# Patient Record
Sex: FEMALE | Race: WHITE | NOT HISPANIC OR LATINO | Employment: OTHER | URBAN - METROPOLITAN AREA
[De-identification: names, ages, dates, MRNs, and addresses within clinical notes are randomized per-mention and may not be internally consistent; named-entity substitution may affect disease eponyms.]

---

## 2017-04-25 RX ORDER — FLUTICASONE PROPIONATE 50 MCG
2 SPRAY, SUSPENSION (ML) NASAL DAILY
Status: ON HOLD | COMMUNITY
End: 2018-07-25

## 2017-04-26 ENCOUNTER — ANESTHESIA EVENT (OUTPATIENT)
Dept: SURGERY | Facility: CLINIC | Age: 72
End: 2017-04-26
Payer: COMMERCIAL

## 2017-04-26 ENCOUNTER — HOSPITAL ENCOUNTER (OUTPATIENT)
Facility: CLINIC | Age: 72
Discharge: HOME | End: 2017-04-26
Attending: OPHTHALMOLOGY | Admitting: OPHTHALMOLOGY
Payer: COMMERCIAL

## 2017-04-26 ENCOUNTER — ANESTHESIA (OUTPATIENT)
Dept: SURGERY | Facility: CLINIC | Age: 72
End: 2017-04-26
Payer: COMMERCIAL

## 2017-04-26 VITALS
SYSTOLIC BLOOD PRESSURE: 141 MMHG | WEIGHT: 136 LBS | RESPIRATION RATE: 16 BRPM | OXYGEN SATURATION: 95 % | DIASTOLIC BLOOD PRESSURE: 76 MMHG | HEIGHT: 65 IN | BODY MASS INDEX: 22.66 KG/M2

## 2017-04-26 PROCEDURE — 25000128 H RX IP 250 OP 636: Performed by: NURSE ANESTHETIST, CERTIFIED REGISTERED

## 2017-04-26 PROCEDURE — 71000028 ZZH EYE RECOVERY PHASE 2 EACH 15 MINS: Performed by: OPHTHALMOLOGY

## 2017-04-26 PROCEDURE — 25000125 ZZHC RX 250: Performed by: OPHTHALMOLOGY

## 2017-04-26 PROCEDURE — 37000008 ZZH ANESTHESIA TECHNICAL FEE, 1ST 30 MIN: Performed by: OPHTHALMOLOGY

## 2017-04-26 PROCEDURE — 25000132 ZZH RX MED GY IP 250 OP 250 PS 637: Performed by: OPHTHALMOLOGY

## 2017-04-26 PROCEDURE — 40000170 ZZH STATISTIC PRE-PROCEDURE ASSESSMENT II: Performed by: OPHTHALMOLOGY

## 2017-04-26 PROCEDURE — 25800025 ZZH RX 258: Performed by: ANESTHESIOLOGY

## 2017-04-26 PROCEDURE — 27210794 ZZH OR GENERAL SUPPLY STERILE: Performed by: OPHTHALMOLOGY

## 2017-04-26 PROCEDURE — V2632 POST CHMBR INTRAOCULAR LENS: HCPCS | Performed by: OPHTHALMOLOGY

## 2017-04-26 PROCEDURE — 25000125 ZZHC RX 250: Performed by: ANESTHESIOLOGY

## 2017-04-26 PROCEDURE — 25000125 ZZHC RX 250: Performed by: NURSE ANESTHETIST, CERTIFIED REGISTERED

## 2017-04-26 PROCEDURE — 36000101 ZZH EYE SURGERY LEVEL 3 1ST 30 MIN: Performed by: OPHTHALMOLOGY

## 2017-04-26 DEVICE — EYE IMP IOL AMO PCL TECNIS ZCB00 20.5: Type: IMPLANTABLE DEVICE | Site: EYE | Status: FUNCTIONAL

## 2017-04-26 RX ORDER — PROPARACAINE HYDROCHLORIDE 5 MG/ML
1 SOLUTION/ DROPS OPHTHALMIC ONCE
Status: COMPLETED | OUTPATIENT
Start: 2017-04-26 | End: 2017-04-26

## 2017-04-26 RX ORDER — BALANCED SALT SOLUTION 6.4; .75; .48; .3; 3.9; 1.7 MG/ML; MG/ML; MG/ML; MG/ML; MG/ML; MG/ML
SOLUTION OPHTHALMIC PRN
Status: DISCONTINUED | OUTPATIENT
Start: 2017-04-26 | End: 2017-04-26 | Stop reason: HOSPADM

## 2017-04-26 RX ORDER — MOXIFLOXACIN 5 MG/ML
1 SOLUTION/ DROPS OPHTHALMIC
Status: COMPLETED | OUTPATIENT
Start: 2017-04-26 | End: 2017-04-26

## 2017-04-26 RX ORDER — SODIUM CHLORIDE, SODIUM LACTATE, POTASSIUM CHLORIDE, CALCIUM CHLORIDE 600; 310; 30; 20 MG/100ML; MG/100ML; MG/100ML; MG/100ML
500 INJECTION, SOLUTION INTRAVENOUS CONTINUOUS
Status: DISCONTINUED | OUTPATIENT
Start: 2017-04-26 | End: 2017-04-26 | Stop reason: HOSPADM

## 2017-04-26 RX ORDER — ONDANSETRON 2 MG/ML
INJECTION INTRAMUSCULAR; INTRAVENOUS PRN
Status: DISCONTINUED | OUTPATIENT
Start: 2017-04-26 | End: 2017-04-26

## 2017-04-26 RX ORDER — TROPICAMIDE 10 MG/ML
1 SOLUTION/ DROPS OPHTHALMIC
Status: COMPLETED | OUTPATIENT
Start: 2017-04-26 | End: 2017-04-26

## 2017-04-26 RX ORDER — DICLOFENAC SODIUM 1 MG/ML
1 SOLUTION/ DROPS OPHTHALMIC
Status: COMPLETED | OUTPATIENT
Start: 2017-04-26 | End: 2017-04-26

## 2017-04-26 RX ORDER — PROPARACAINE HYDROCHLORIDE 5 MG/ML
1 SOLUTION/ DROPS OPHTHALMIC ONCE
Status: DISCONTINUED | OUTPATIENT
Start: 2017-04-26 | End: 2017-04-26 | Stop reason: HOSPADM

## 2017-04-26 RX ORDER — PHENYLEPHRINE HYDROCHLORIDE 25 MG/ML
1 SOLUTION/ DROPS OPHTHALMIC
Status: COMPLETED | OUTPATIENT
Start: 2017-04-26 | End: 2017-04-26

## 2017-04-26 RX ORDER — LIDOCAINE HYDROCHLORIDE 10 MG/ML
INJECTION, SOLUTION EPIDURAL; INFILTRATION; INTRACAUDAL; PERINEURAL PRN
Status: DISCONTINUED | OUTPATIENT
Start: 2017-04-26 | End: 2017-04-26 | Stop reason: HOSPADM

## 2017-04-26 RX ADMIN — PHENYLEPHRINE HYDROCHLORIDE 1 DROP: 2.5 SOLUTION/ DROPS OPHTHALMIC at 07:39

## 2017-04-26 RX ADMIN — DEXMEDETOMIDINE HYDROCHLORIDE 8 MCG: 100 INJECTION, SOLUTION INTRAVENOUS at 08:29

## 2017-04-26 RX ADMIN — TROPICAMIDE 1 DROP: 10 SOLUTION/ DROPS OPHTHALMIC at 07:35

## 2017-04-26 RX ADMIN — PHENYLEPHRINE HYDROCHLORIDE 1 DROP: 2.5 SOLUTION/ DROPS OPHTHALMIC at 07:42

## 2017-04-26 RX ADMIN — MIDAZOLAM HYDROCHLORIDE 1 MG: 1 INJECTION, SOLUTION INTRAMUSCULAR; INTRAVENOUS at 08:31

## 2017-04-26 RX ADMIN — DICLOFENAC SODIUM 1 DROP: 1 SOLUTION/ DROPS OPHTHALMIC at 07:35

## 2017-04-26 RX ADMIN — MOXIFLOXACIN HYDROCHLORIDE 1 DROP: 5 SOLUTION/ DROPS OPHTHALMIC at 07:35

## 2017-04-26 RX ADMIN — PROPARACAINE HYDROCHLORIDE 1 DROP: 5 SOLUTION/ DROPS OPHTHALMIC at 07:35

## 2017-04-26 RX ADMIN — DICLOFENAC SODIUM 1 DROP: 1 SOLUTION/ DROPS OPHTHALMIC at 07:39

## 2017-04-26 RX ADMIN — MOXIFLOXACIN HYDROCHLORIDE 1 DROP: 5 SOLUTION/ DROPS OPHTHALMIC at 07:39

## 2017-04-26 RX ADMIN — PHENYLEPHRINE HYDROCHLORIDE 1 DROP: 2.5 SOLUTION/ DROPS OPHTHALMIC at 07:35

## 2017-04-26 RX ADMIN — SODIUM CHLORIDE, POTASSIUM CHLORIDE, SODIUM LACTATE AND CALCIUM CHLORIDE 500 ML: 600; 310; 30; 20 INJECTION, SOLUTION INTRAVENOUS at 07:47

## 2017-04-26 RX ADMIN — ONDANSETRON 4 MG: 2 INJECTION INTRAMUSCULAR; INTRAVENOUS at 08:45

## 2017-04-26 RX ADMIN — TROPICAMIDE 1 DROP: 10 SOLUTION/ DROPS OPHTHALMIC at 07:39

## 2017-04-26 RX ADMIN — TROPICAMIDE 1 DROP: 10 SOLUTION/ DROPS OPHTHALMIC at 07:42

## 2017-04-26 RX ADMIN — LIDOCAINE HYDROCHLORIDE 1 ML: 10 INJECTION, SOLUTION EPIDURAL; INFILTRATION; INTRACAUDAL; PERINEURAL at 07:46

## 2017-04-26 RX ADMIN — MOXIFLOXACIN HYDROCHLORIDE 1 DROP: 5 SOLUTION/ DROPS OPHTHALMIC at 07:42

## 2017-04-26 RX ADMIN — DICLOFENAC SODIUM 1 DROP: 1 SOLUTION/ DROPS OPHTHALMIC at 07:42

## 2017-04-26 ASSESSMENT — ENCOUNTER SYMPTOMS
SEIZURES: 0
ORTHOPNEA: 0

## 2017-04-26 NOTE — IP AVS SNAPSHOT
[facility]    [address]    [place] [address]    Phone:  [phone]                                       After Visit Summary   4/26/2017    Tracy Yee    MRN: [id number]           After Visit Summary Signature Page     I have received my discharge instructions, and my questions have been answered. I have discussed any challenges I see with this plan with the nurse or doctor.    ..........................................................................................................................................  Patient/Patient Representative Signature      ..........................................................................................................................................  Patient Representative Print Name and Relationship to Patient    ..................................................               ................................................  Date                                            Time    ..........................................................................................................................................  Reviewed by Signature/Title    ...................................................              ..............................................  Date                                                            Time

## 2017-04-26 NOTE — IP AVS SNAPSHOT
MRN:[id number]                      After Visit Summary   4/26/2017    Tracy Yee    MRN: [id number]           Thank you!     Thank you for choosing [facility] for your care. Our goal is always to provide you with excellent care. Hearing back from our patients is one way we can continue to improve our services. Please take a few minutes to complete the written survey that you may receive in the mail after you visit with us. Thank you!        Patient Information     Date Of Birth          1945        About your hospital stay     You were admitted on:  April 26, 2017 You last received care in the:  [facility]    You were discharged on:  April 26, 2017       Who to Call     For medical emergencies, please call 911.  For non-urgent questions about your medical care, please call your primary care provider or clinic, [phone]  For questions related to your surgery, please call your surgery clinic        Attending Provider     Provider Specialty    Judd Moscoso MD Ophthalmology       Primary Care Provider Office Phone # Fax #    Kaye Keene [phone]       [facility] [address]  [facility] [address]        Your next 10 appointments already scheduled     May 03, 2017   Procedure with Judd Moscoso MD   [facility]OP [facility] (--)    [address]  [facility] [address] [phone]              Further instructions from your care team       [facility] Discharge  Instructions  Anesthesia ([facility])   Adult Discharge Instructions    For 24 hours after surgery    1. Get plenty of rest.  Make arrangements to have a responsible adult stay with you for at least 6 hours after you leave the hospital.  2. Do not drive or use heavy equipment for 24 hours.    3. Do not drink alcohol for 24 hours.  4. Do not sign legal documents or make important decisions for 24  hours.  5. Avoid strenuous or risky activities. You may feel lightheaded.  If so, sit for a few minutes before standing.  Have someone help you get up.   6. Conscious sedation patients may resume a regular diet..  7. Any questions of medical nature, call your physician.        POST-OPERATIVE CARE FOLLOWING CATARACT SURGERY    DR. HOLLAND PONCE  [facility]  [phone]    Postoperative medications: After surgery, you will use several different eye drop medications. You may have either the brand specific form or generic of each type used.     Begin your eye drops today as directed.  You should instill the drop, close the eye without blinking and keep closed for 3 minutes allowing the drop to absorb.  It is fine to instill all 3 of the drops consecutively, waiting the 3 minutes in between each one. Place the shield for sleep.  In the morning, instill 1 drop of all 3 eye drops before your post-op appointment.      Antibiotic  Moxeza - is an antibiotic drop that is used to minimize the risk of infection. Instill your first drop at bedtime tonight, then 2 times daily for one week.       -OR-    Ofloxacin - this generic antibiotic is to be used 4 times a day for one week, you can start using this drop after you get home, instilling 3 separate times on the day of surgery and then 4 times a day there after.     Anti-inflammatory   Prolensa - use it once daily until gone, beginning today.    -OR-    Ketorolac -this generic drop should be used 4 times daily until gone. You can start your drops when you get home, instilling 3 separate times on the day of surgery.     Steroid  Durezol - is a steroid drop used to minimize inflammation and modulate healing.  It should be used 2 times daily until gone, beginning with your first drop at bedtime tonight.    -OR-    Prednisolone - the generic form should be used 4 times daily for 3 weeks or until gone. You can start your first drop after you get home, instilling 3 separate  "times on the day of surgery.        Do not rub the operated eye. Wear the eye shield during sleeping hours for one week.      Light sensitivity may be noticed. Sunglasses may be worn for comfort.      Some discomfort and irritation may be noticed. Acetaminophen (Tylenol) or Ibuprofen (Advil) may be taken for discomfort.      Avoid bending over, strenuous activity or heavy lifting for one week.      Keep the operated eye dry.      You may wash your hair, bathe or shower, but keep the operated eye closed while doing so.      Use medication exactly as prescribed by your doctor.  You may restart your regular home medications.      Bring all materials and medications to the clinic on your first post-operative visit.      Call the doctor s office if any of the following should occur:  -  any sudden vision change  -  nausea or severe headache  -  increase in pain not controlled  -  increased amount of floaters (black spots in front of vision)         -  or signs of infection (pus, increasing redness or tenderness)            Revised 12/10/2015    Pending Results     No orders found from 4/24/2017 to 4/27/2017.            Admission Information     Date & Time Provider Department Dept. Phone    4/26/2017 Judd Moscoso MD [facility] [phone]      Your Vitals Were     Height Weight BMI (Body Mass Index)             1.64 m (5' 4.57\") 61.7 kg (136 lb) 22.94 kg/m2         [employer] Information     [employer] lets you send messages to your doctor, view your test results, renew your prescriptions, schedule appointments and more. To sign up, go to [email or web address] . Click on \"Log in\" on the left side of the screen, which will take you to the Welcome page. Then click on \"Sign up Now\" on the right side of the page.     You will be asked to enter the access code listed below, as well as some personal information. Please follow the directions to create your username and password.     Your access code is: " [email or web address]  Expires: 2017  8:47 AM     Your access code will  in 90 days. If you need help or a new code, please call your [facility] or [phone].        Care EveryWhere ID     This is your Care EveryWhere ID. This could be used by other organizations to access your [facility] medical records  [id number]           Review of your medicines      UNREVIEWED medicines. Ask your doctor about these medicines        Dose / Directions    fluticasone 50 MCG/ACT spray   Commonly known as:  FLONASE        Dose:  2 spray   Spray 2 sprays into both nostrils daily   Refills:  0       HYDROCHLOROTHIAZIDE PO        Dose:  12.5 mg   Take 12.5 mg by mouth daily   Refills:  0       INDERAL LA PO        Dose:  60 mg   Take 60 mg by mouth daily   Refills:  0       SYNTHROID PO        Dose:  12.5 mcg   Take 12.5 mcg by mouth daily   Refills:  0                Protect others around you: Learn how to safely use, store and throw away your medicines at www.disposemymeds.org.             Medication List: This is a list of all your medications and when to take them. Check marks below indicate your daily home schedule. Keep this list as a reference.      Medications           Morning Afternoon Evening Bedtime As Needed    fluticasone 50 MCG/ACT spray   Commonly known as:  FLONASE   Spray 2 sprays into both nostrils daily                                HYDROCHLOROTHIAZIDE PO   Take 12.5 mg by mouth daily                                INDERAL LA PO   Take 60 mg by mouth daily                                SYNTHROID PO   Take 12.5 mcg by mouth daily

## 2017-04-26 NOTE — OP NOTE
[facility]  Ophthalmology Operative Note    PREOPERATIVE DIAGNOSIS:  Dense cataract of the Right eye.       POSTOPERATIVE DIAGNOSIS:  Dense cataract of the Right eye.       OPERATION:  Clear corneal phacoemulsification with intraocular lens implant of the Right eye.       PROCEDURE:  Tracy Yee was brought into the operating room with a topical anesthetic.  Under the microscope after a sterile ophthalmic prep, a lid speculum was put into place.  Anterior chamber was entered with a #75 blade.  Anterior chamber was then filled with lidocaine solution and a viscoelastic material.  A keratome blade was then used to fashion a 2.6 mm temporal incision in the corneoscleral junction and anterior capsule of the lens was opened using a circular capsulorrhexis.  The lens was carefully hydrodissected.  The phacoemulsification tip was then introduced into the eye and a central groove fashioned.  The nucleus was split in half and then quartered, and nuclear material was aspirated.  The irrigation-aspiration apparatus was then utilized to clear the remaining cortical material.  The posterior capsule was polished and a foldable posterior chamber intraocular lens was then introduced into the capsular bag, unfolded, and rotated into the horizontal position.  No sutures were necessary to close the incision site.  The viscoelastic material was aspirated.  Drops of Moxeza and Prolensa were used on the surface of the eye and a clear shield was put over the eye before the patient was dismissed from the operating room.  The patient tolerated the procedure without difficulty.       Implant Name Type Inv. Item Serial No.  Lot No. LRB No. Used   EYE IMP IOL SILVIA PCL TECNIS ZCB00 20.5 Lens/Eye Implant EYE IMP IOL SIVLIA PCL TECNIS ZCB00 20.5 [id number] [employer]   Right 1           HOLLAND PONCE MD

## 2017-04-26 NOTE — DISCHARGE INSTRUCTIONS
[facility] Anesthesia Eye Care Center Discharge  Instructions  Anesthesia ([facility])   Adult Discharge Instructions    For 24 hours after surgery    1. Get plenty of rest.  Make arrangements to have a responsible adult stay with you for at least 6 hours after you leave the hospital.  2. Do not drive or use heavy equipment for 24 hours.    3. Do not drink alcohol for 24 hours.  4. Do not sign legal documents or make important decisions for 24 hours.  5. Avoid strenuous or risky activities. You may feel lightheaded.  If so, sit for a few minutes before standing.  Have someone help you get up.   6. Conscious sedation patients may resume a regular diet..  7. Any questions of medical nature, call your physician.        POST-OPERATIVE CARE FOLLOWING CATARACT SURGERY    DR. HOLLAND PONCE  [place] [facility]  [phone]    Postoperative medications: After surgery, you will use several different eye drop medications. You may have either the brand specific form or generic of each type used.     Begin your eye drops today as directed.  You should instill the drop, close the eye without blinking and keep closed for 3 minutes allowing the drop to absorb.  It is fine to instill all 3 of the drops consecutively, waiting the 3 minutes in between each one. Place the shield for sleep.  In the morning, instill 1 drop of all 3 eye drops before your post-op appointment.      Antibiotic  Moxeza - is an antibiotic drop that is used to minimize the risk of infection. Instill your first drop at bedtime tonight, then 2 times daily for one week.       -OR-    Ofloxacin - this generic antibiotic is to be used 4 times a day for one week, you can start using this drop after you get home, instilling 3 separate times on the day of surgery and then 4 times a day there after.     Anti-inflammatory   Prolensa - use it once daily until gone, beginning today.    -OR-    Ketorolac -this generic drop should be used 4 times daily until  gone. You can start your drops when you get home, instilling 3 separate times on the day of surgery.     Steroid  Durezol - is a steroid drop used to minimize inflammation and modulate healing.  It should be used 2 times daily until gone, beginning with your first drop at bedtime tonight.    -OR-    Prednisolone - the generic form should be used 4 times daily for 3 weeks or until gone. You can start your first drop after you get home, instilling 3 separate times on the day of surgery.        Do not rub the operated eye. Wear the eye shield during sleeping hours for one week.      Light sensitivity may be noticed. Sunglasses may be worn for comfort.      Some discomfort and irritation may be noticed. Acetaminophen (Tylenol) or Ibuprofen (Advil) may be taken for discomfort.      Avoid bending over, strenuous activity or heavy lifting for one week.      Keep the operated eye dry.      You may wash your hair, bathe or shower, but keep the operated eye closed while doing so.      Use medication exactly as prescribed by your doctor.  You may restart your regular home medications.      Bring all materials and medications to the clinic on your first post-operative visit.      Call the doctor s office if any of the following should occur:  -  any sudden vision change  -  nausea or severe headache  -  increase in pain not controlled  -  increased amount of floaters (black spots in front of vision)         -  or signs of infection (pus, increasing redness or tenderness)            Revised 12/10/2015

## 2017-04-26 NOTE — ANESTHESIA PREPROCEDURE EVALUATION
"Procedure: Procedure(s):  PHACOEMULSIFICATION CLEAR CORNEA WITH STANDARD INTRAOCULAR LENS IMPLANT  Preop diagnosis: RIGHT EYE CATARACT  Allergies   Allergen Reactions     Sulfa Drugs      Sulfonamide antibiotics     There is no problem list on file for this patient.    Past Medical History:   Diagnosis Date     Breast cancer (H)     breast lumpectomy     Hypertension      Osteopenia      Parkinson's disease (H)      Prediabetes      Thyroid disease     hypothyroidism     Past Surgical History:   Procedure Laterality Date     BREAST SURGERY      lumpectomy     COLONOSCOPY       GYN SURGERY      hysterectomy       No current facility-administered medications on file prior to encounter.   No current outpatient prescriptions on file prior to encounter.  Ht 1.64 m (5' 4.57\")  Wt 61.7 kg (136 lb)  BMI 22.94 kg/m2    HGB 13.8  K 4.0  Anesthesia Evaluation     . Pt has had prior anesthetic.     History of anesthetic complications   - PONV        ROS/MED HX    ENT/Pulmonary:      (-) sleep apnea and recent URI   Neurologic:     (+)Parkinson's disease    (-) seizures, CVA and migraines   Cardiovascular:     (+) hypertension----. : . . . :. .      (-) CHF and orthopnea/PND   METS/Exercise Tolerance:     Hematologic:  - neg hematologic  ROS       Musculoskeletal: Comment: osteopenia        GI/Hepatic:        (-) GERD   Renal/Genitourinary:  - ROS Renal section negative       Endo: Comment: prediabetic    (+) thyroid problem hypothyroidism, .   (-) Type II DM   Psychiatric:         Infectious Disease:  - neg infectious disease ROS       Malignancy:   (+) Malignancy History of Breast          Other: Comment: Cystocele/ rectocele/ uterine prolapse                    Physical Exam  Normal systems: cardiovascular, pulmonary and dental    Airway   Mallampati: II  TM distance: >3 FB  Neck ROM: limited    Dental     Cardiovascular   Rhythm and rate: regular and normal      Pulmonary    breath sounds clear to auscultation           "          Anesthesia Plan      History & Physical Review  History and physical reviewed and following examination; no interval change.    ASA Status:  2 .    NPO Status:  > 8 hours    Plan for MAC Reason for MAC:  Procedure to face, neck, head or breast  PONV prophylaxis:  Ondansetron (or other 5HT-3)       Postoperative Care  Postoperative pain management:  IV analgesics.      Consents  Anesthetic plan, risks, benefits and alternatives discussed with:  Patient..                          .

## 2017-04-26 NOTE — ANESTHESIA CARE TRANSFER NOTE
Patient: Tracy Yee    Procedure(s):  RIGHT EYE PHACOEMULSIFICATION CLEAR CORNEA WITH STANDARD INTRAOCULAR LENS IMPLANT  - Wound Class: I-Clean    Diagnosis: RIGHT EYE CATARACT  Diagnosis Additional Information: No value filed.    Anesthesia Type:   MAC     Note:  Airway :Room Air  Patient transferred to:PACU  Comments: Pt exhibits spont resps, all monitors and alarms on in pacu, report given to RN, vss.      Vitals: (Last set prior to Anesthesia Care Transfer)    CRNA VITALS  4/26/2017 0821 - 4/26/2017 0857      4/26/2017             Pulse: 59    Ht Rate: 59    SpO2: 98 %    Resp Rate (set): 10                Electronically Signed By: ABI Tillman CRNA  April 26, 2017  8:57 AM

## 2017-04-26 NOTE — ANESTHESIA POSTPROCEDURE EVALUATION
Patient: Tracy Yee    Procedure(s):  RIGHT EYE PHACOEMULSIFICATION CLEAR CORNEA WITH STANDARD INTRAOCULAR LENS IMPLANT  - Wound Class: I-Clean    Diagnosis:RIGHT EYE CATARACT  Diagnosis Additional Information: No value filed.    Anesthesia Type:  MAC    Note:  Anesthesia Post Evaluation    Patient location during evaluation: PACU  Patient participation: Able to fully participate in evaluation  Level of consciousness: awake  Pain management: adequate  Airway patency: patent  Cardiovascular status: acceptable  Respiratory status: acceptable  Hydration status: acceptable  PONV: none     Anesthetic complications: None          Last vitals:  Vitals:    04/26/17 0858 04/26/17 0905   BP: 133/79 141/76   Resp: 16 16   SpO2: 96% 95%         Electronically Signed By: Elie Vega MD  April 26, 2017  4:58 PM

## 2017-05-03 ENCOUNTER — SURGERY (OUTPATIENT)
Age: 72
End: 2017-05-03

## 2017-05-03 ENCOUNTER — ANESTHESIA EVENT (OUTPATIENT)
Dept: SURGERY | Facility: CLINIC | Age: 72
End: 2017-05-03
Payer: COMMERCIAL

## 2017-05-03 ENCOUNTER — ANESTHESIA (OUTPATIENT)
Dept: SURGERY | Facility: CLINIC | Age: 72
End: 2017-05-03
Payer: COMMERCIAL

## 2017-05-03 ENCOUNTER — HOSPITAL ENCOUNTER (OUTPATIENT)
Facility: CLINIC | Age: 72
Discharge: HOME | End: 2017-05-03
Attending: OPHTHALMOLOGY | Admitting: OPHTHALMOLOGY
Payer: COMMERCIAL

## 2017-05-03 VITALS
RESPIRATION RATE: 15 BRPM | TEMPERATURE: 98.4 F | WEIGHT: 136 LBS | BODY MASS INDEX: 22.66 KG/M2 | SYSTOLIC BLOOD PRESSURE: 100 MMHG | HEART RATE: 63 BPM | DIASTOLIC BLOOD PRESSURE: 66 MMHG | HEIGHT: 65 IN | OXYGEN SATURATION: 97 %

## 2017-05-03 PROCEDURE — V2632 POST CHMBR INTRAOCULAR LENS: HCPCS | Performed by: OPHTHALMOLOGY

## 2017-05-03 PROCEDURE — 25000125 ZZHC RX 250: Performed by: ANESTHESIOLOGY

## 2017-05-03 PROCEDURE — 36000101 ZZH EYE SURGERY LEVEL 3 1ST 30 MIN: Performed by: OPHTHALMOLOGY

## 2017-05-03 PROCEDURE — 25800025 ZZH RX 258: Performed by: ANESTHESIOLOGY

## 2017-05-03 PROCEDURE — 25000125 ZZHC RX 250: Performed by: NURSE ANESTHETIST, CERTIFIED REGISTERED

## 2017-05-03 PROCEDURE — 71000028 ZZH EYE RECOVERY PHASE 2 EACH 15 MINS: Performed by: OPHTHALMOLOGY

## 2017-05-03 PROCEDURE — 25000128 H RX IP 250 OP 636: Performed by: NURSE ANESTHETIST, CERTIFIED REGISTERED

## 2017-05-03 PROCEDURE — 37000008 ZZH ANESTHESIA TECHNICAL FEE, 1ST 30 MIN: Performed by: OPHTHALMOLOGY

## 2017-05-03 PROCEDURE — 40000170 ZZH STATISTIC PRE-PROCEDURE ASSESSMENT II: Performed by: OPHTHALMOLOGY

## 2017-05-03 PROCEDURE — 25000132 ZZH RX MED GY IP 250 OP 250 PS 637: Performed by: OPHTHALMOLOGY

## 2017-05-03 PROCEDURE — 25000125 ZZHC RX 250: Performed by: OPHTHALMOLOGY

## 2017-05-03 PROCEDURE — 27210794 ZZH OR GENERAL SUPPLY STERILE: Performed by: OPHTHALMOLOGY

## 2017-05-03 DEVICE — EYE IMP IOL AMO PCL TECNIS ZCB00 20.5: Type: IMPLANTABLE DEVICE | Site: EYE | Status: FUNCTIONAL

## 2017-05-03 RX ORDER — PROPARACAINE HYDROCHLORIDE 5 MG/ML
1 SOLUTION/ DROPS OPHTHALMIC ONCE
Status: DISCONTINUED | OUTPATIENT
Start: 2017-05-03 | End: 2017-05-03 | Stop reason: HOSPADM

## 2017-05-03 RX ORDER — MOXIFLOXACIN 5 MG/ML
1 SOLUTION/ DROPS OPHTHALMIC
Status: COMPLETED | OUTPATIENT
Start: 2017-05-03 | End: 2017-05-03

## 2017-05-03 RX ORDER — LIDOCAINE HYDROCHLORIDE 10 MG/ML
INJECTION, SOLUTION EPIDURAL; INFILTRATION; INTRACAUDAL; PERINEURAL PRN
Status: DISCONTINUED | OUTPATIENT
Start: 2017-05-03 | End: 2017-05-03 | Stop reason: HOSPADM

## 2017-05-03 RX ORDER — PHENYLEPHRINE HYDROCHLORIDE 25 MG/ML
1 SOLUTION/ DROPS OPHTHALMIC
Status: COMPLETED | OUTPATIENT
Start: 2017-05-03 | End: 2017-05-03

## 2017-05-03 RX ORDER — BALANCED SALT SOLUTION 6.4; .75; .48; .3; 3.9; 1.7 MG/ML; MG/ML; MG/ML; MG/ML; MG/ML; MG/ML
SOLUTION OPHTHALMIC PRN
Status: DISCONTINUED | OUTPATIENT
Start: 2017-05-03 | End: 2017-05-03 | Stop reason: HOSPADM

## 2017-05-03 RX ORDER — DICLOFENAC SODIUM 1 MG/ML
1 SOLUTION/ DROPS OPHTHALMIC
Status: COMPLETED | OUTPATIENT
Start: 2017-05-03 | End: 2017-05-03

## 2017-05-03 RX ORDER — ONDANSETRON 2 MG/ML
INJECTION INTRAMUSCULAR; INTRAVENOUS PRN
Status: DISCONTINUED | OUTPATIENT
Start: 2017-05-03 | End: 2017-05-03

## 2017-05-03 RX ORDER — SODIUM CHLORIDE, SODIUM LACTATE, POTASSIUM CHLORIDE, CALCIUM CHLORIDE 600; 310; 30; 20 MG/100ML; MG/100ML; MG/100ML; MG/100ML
500 INJECTION, SOLUTION INTRAVENOUS CONTINUOUS
Status: DISCONTINUED | OUTPATIENT
Start: 2017-05-03 | End: 2017-05-03 | Stop reason: HOSPADM

## 2017-05-03 RX ORDER — TROPICAMIDE 10 MG/ML
1 SOLUTION/ DROPS OPHTHALMIC
Status: COMPLETED | OUTPATIENT
Start: 2017-05-03 | End: 2017-05-03

## 2017-05-03 RX ORDER — PROPARACAINE HYDROCHLORIDE 5 MG/ML
1 SOLUTION/ DROPS OPHTHALMIC ONCE
Status: COMPLETED | OUTPATIENT
Start: 2017-05-03 | End: 2017-05-03

## 2017-05-03 RX ADMIN — ONDANSETRON 4 MG: 2 INJECTION INTRAMUSCULAR; INTRAVENOUS at 08:13

## 2017-05-03 RX ADMIN — MOXIFLOXACIN HYDROCHLORIDE 1 DROP: 5 SOLUTION/ DROPS OPHTHALMIC at 07:42

## 2017-05-03 RX ADMIN — MOXIFLOXACIN HYDROCHLORIDE 1 DROP: 5 SOLUTION/ DROPS OPHTHALMIC at 07:37

## 2017-05-03 RX ADMIN — LIDOCAINE HYDROCHLORIDE 0.3 ML: 10 INJECTION, SOLUTION EPIDURAL; INFILTRATION; INTRACAUDAL; PERINEURAL at 07:39

## 2017-05-03 RX ADMIN — TROPICAMIDE 1 DROP: 10 SOLUTION/ DROPS OPHTHALMIC at 07:31

## 2017-05-03 RX ADMIN — DEXMEDETOMIDINE HYDROCHLORIDE 4 MCG: 100 INJECTION, SOLUTION INTRAVENOUS at 08:19

## 2017-05-03 RX ADMIN — DICLOFENAC SODIUM 1 DROP: 1 SOLUTION/ DROPS OPHTHALMIC at 07:42

## 2017-05-03 RX ADMIN — TROPICAMIDE 1 DROP: 10 SOLUTION/ DROPS OPHTHALMIC at 07:42

## 2017-05-03 RX ADMIN — SODIUM CHLORIDE, POTASSIUM CHLORIDE, SODIUM LACTATE AND CALCIUM CHLORIDE 500 ML: 600; 310; 30; 20 INJECTION, SOLUTION INTRAVENOUS at 07:38

## 2017-05-03 RX ADMIN — PROPARACAINE HYDROCHLORIDE 1 DROP: 5 SOLUTION/ DROPS OPHTHALMIC at 07:31

## 2017-05-03 RX ADMIN — PHENYLEPHRINE HYDROCHLORIDE 1 DROP: 2.5 SOLUTION/ DROPS OPHTHALMIC at 07:37

## 2017-05-03 RX ADMIN — LIDOCAINE HYDROCHLORIDE 2 DROP: 35 GEL OPHTHALMIC at 08:25

## 2017-05-03 RX ADMIN — DICLOFENAC SODIUM 1 DROP: 1 SOLUTION/ DROPS OPHTHALMIC at 07:31

## 2017-05-03 RX ADMIN — PHENYLEPHRINE HYDROCHLORIDE 1 DROP: 2.5 SOLUTION/ DROPS OPHTHALMIC at 07:31

## 2017-05-03 RX ADMIN — TROPICAMIDE 1 DROP: 10 SOLUTION/ DROPS OPHTHALMIC at 07:37

## 2017-05-03 RX ADMIN — LIDOCAINE HYDROCHLORIDE 1 ML: 10 INJECTION, SOLUTION EPIDURAL; INFILTRATION; INTRACAUDAL; PERINEURAL at 08:25

## 2017-05-03 RX ADMIN — SODIUM CHONDROITIN SULFATE / SODIUM HYALURONATE 1 ML: 0.55-0.5 INJECTION INTRAOCULAR at 08:25

## 2017-05-03 RX ADMIN — MIDAZOLAM HYDROCHLORIDE 0.5 MG: 1 INJECTION, SOLUTION INTRAMUSCULAR; INTRAVENOUS at 08:10

## 2017-05-03 RX ADMIN — MIDAZOLAM HYDROCHLORIDE 1 MG: 1 INJECTION, SOLUTION INTRAMUSCULAR; INTRAVENOUS at 08:07

## 2017-05-03 RX ADMIN — MIDAZOLAM HYDROCHLORIDE 0.5 MG: 1 INJECTION, SOLUTION INTRAMUSCULAR; INTRAVENOUS at 08:13

## 2017-05-03 RX ADMIN — DICLOFENAC SODIUM 1 DROP: 1 SOLUTION/ DROPS OPHTHALMIC at 07:37

## 2017-05-03 RX ADMIN — BALANCED SALT SOLUTION 15 ML: 6.4; .75; .48; .3; 3.9; 1.7 SOLUTION OPHTHALMIC at 08:24

## 2017-05-03 RX ADMIN — MOXIFLOXACIN HYDROCHLORIDE 1 DROP: 5 SOLUTION/ DROPS OPHTHALMIC at 07:31

## 2017-05-03 RX ADMIN — EPINEPHRINE 500 ML: 1 INJECTION, SOLUTION INTRAMUSCULAR; SUBCUTANEOUS at 08:24

## 2017-05-03 RX ADMIN — PHENYLEPHRINE HYDROCHLORIDE 1 DROP: 2.5 SOLUTION/ DROPS OPHTHALMIC at 07:42

## 2017-05-03 ASSESSMENT — ENCOUNTER SYMPTOMS
SEIZURES: 0
ORTHOPNEA: 0

## 2017-05-03 NOTE — IP AVS SNAPSHOT
[facility]    [address]    [place] [address]    Phone:  [phone]    Fax:  [phone]                                       After Visit Summary   5/3/2017    Tracy Yee    MRN: [id number]           After Visit Summary Signature Page     I have received my discharge instructions, and my questions have been answered. I have discussed any challenges I see with this plan with the nurse or doctor.    ..........................................................................................................................................  Patient/Patient Representative Signature      ..........................................................................................................................................  Patient Representative Print Name and Relationship to Patient    ..................................................               ................................................  Date                                            Time    ..........................................................................................................................................  Reviewed by Signature/Title    ...................................................              ..............................................  Date                                                            Time

## 2017-05-03 NOTE — ANESTHESIA PREPROCEDURE EVALUATION
"Procedure: Procedure(s):  PHACOEMULSIFICATION CLEAR CORNEA WITH STANDARD INTRAOCULAR LENS IMPLANT  Preop diagnosis: RIGHT EYE CATARACT  Allergies   Allergen Reactions     Sulfa Drugs      Sulfonamide antibiotics     There is no problem list on file for this patient.    Past Medical History:   Diagnosis Date     Breast cancer (H)     breast lumpectomy     Hypertension      Osteopenia      Parkinson's disease (H)      Prediabetes      Thyroid disease     hypothyroidism     Past Surgical History:   Procedure Laterality Date     BREAST SURGERY      lumpectomy     COLONOSCOPY       GYN SURGERY      hysterectomy     PHACOEMULSIFICATION CLEAR CORNEA WITH STANDARD INTRAOCULAR LENS IMPLANT Right 4/26/2017    Procedure: PHACOEMULSIFICATION CLEAR CORNEA WITH STANDARD INTRAOCULAR LENS IMPLANT;  RIGHT EYE PHACOEMULSIFICATION CLEAR CORNEA WITH STANDARD INTRAOCULAR LENS IMPLANT ;  Surgeon: Judd Moscoso MD;  Location: [facility]       No current facility-administered medications on file prior to encounter.   Current Outpatient Prescriptions on File Prior to Encounter:  HYDROCHLOROTHIAZIDE PO Take 12.5 mg by mouth daily   Levothyroxine Sodium (SYNTHROID PO) Take 12.5 mcg by mouth daily   Propranolol HCl (INDERAL LA PO) Take 60 mg by mouth daily   fluticasone (FLONASE) 50 MCG/ACT spray Spray 2 sprays into both nostrils daily     BP (!) 147/94  Pulse 63  Temp 36.9  C (98.4  F) (Temporal)  Resp 16  Ht 1.64 m (5' 4.57\")  Wt 61.7 kg (136 lb)  SpO2 97%  BMI 22.94 kg/m2    HGB 13.8  K 4.0  Anesthesia Evaluation     . Pt has had prior anesthetic.     History of anesthetic complications   - PONV        ROS/MED HX    ENT/Pulmonary:      (-) sleep apnea and recent URI   Neurologic:     (+)Parkinson's disease    (-) seizures, CVA and migraines   Cardiovascular:     (+) hypertension----. : . . . :. .      (-) CHF and orthopnea/PND   METS/Exercise Tolerance:     Hematologic:  - neg hematologic  ROS       Musculoskeletal: Comment: " osteopenia        GI/Hepatic:        (-) GERD   Renal/Genitourinary:  - ROS Renal section negative       Endo: Comment: prediabetic    (+) thyroid problem hypothyroidism, .   (-) Type II DM   Psychiatric:         Infectious Disease:  - neg infectious disease ROS       Malignancy:   (+) Malignancy History of Breast          Other: Comment: Cystocele/ rectocele/ uterine prolapse                    Physical Exam  Normal systems: cardiovascular, pulmonary and dental    Airway   Mallampati: II  TM distance: >3 FB  Neck ROM: limited    Dental     Cardiovascular   Rhythm and rate: regular and normal      Pulmonary    breath sounds clear to auscultation                        Anesthesia Plan      History & Physical Review  History and physical reviewed and following examination; no interval change.    ASA Status:  2 .    NPO Status:  > 8 hours    Plan for MAC Reason for MAC:  Procedure to face, neck, head or breast  PONV prophylaxis:  Ondansetron (or other 5HT-3)       Postoperative Care  Postoperative pain management:  IV analgesics.      Consents  Anesthetic plan, risks, benefits and alternatives discussed with:  Patient..                          .

## 2017-05-03 NOTE — OP NOTE
[facility]  Ophthalmology Operative Note    PREOPERATIVE DIAGNOSIS:  Dense cataract of the Left eye.       POSTOPERATIVE DIAGNOSIS:  Dense cataract of the Left eye.       OPERATION:  Clear corneal phacoemulsification with intraocular lens implant of the Left eye.       PROCEDURE:  Tracy Yee was brought into the operating room with a topical anesthetic.  Under the microscope after a sterile ophthalmic prep, a lid speculum was put into place.  Anterior chamber was entered with a #75 blade.  Anterior chamber was then filled with lidocaine solution and a viscoelastic material.  A keratome blade was then used to fashion a 2.6 mm temporal incision in the corneoscleral junction and anterior capsule of the lens was opened using a circular capsulorrhexis.  The lens was carefully hydrodissected.  The phacoemulsification tip was then introduced into the eye and a central groove fashioned.  The nucleus was split in half and then quartered, and nuclear material was aspirated.  The irrigation-aspiration apparatus was then utilized to clear the remaining cortical material.  The posterior capsule was polished and a foldable posterior chamber intraocular lens was then introduced into the capsular bag, unfolded, and rotated into the horizontal position.  No sutures were necessary to close the incision site.  The viscoelastic material was aspirated.  Drops of Moxeza and Prolensa were used on the surface of the eye and a clear shield was put over the eye before the patient was dismissed from the operating room.  The patient tolerated the procedure without difficulty.       Implant Name Type Inv. Item Serial No.  Lot No. LRB No. Used   EYE IMP IOL SILVIA PCL TECNIS ZCB00 20.5 Lens/Eye Implant EYE IMP IOL SILVIA PCL TECNIS ZCB00 20.5 [id number] [employer]   Left 1           HOLLAND PONCE MD

## 2017-05-03 NOTE — IP AVS SNAPSHOT
MRN:[id number]                      After Visit Summary   5/3/2017    Tracy Yee    MRN: [id number]           Thank you!     Thank you for choosing [facility] for your care. Our goal is always to provide you with excellent care. Hearing back from our patients is one way we can continue to improve our services. Please take a few minutes to complete the written survey that you may receive in the mail after you visit with us. Thank you!        Patient Information     Date Of Birth          1945        About your hospital stay     You were admitted on:  May 3, 2017 You last received care in the:  [facility]    You were discharged on:  May 3, 2017       Who to Call     For medical emergencies, please call 911.  For non-urgent questions about your medical care, please call your primary care provider or clinic, [phone]  For questions related to your surgery, please call your surgery clinic        Attending Provider     Provider Specialty    Judd Moscoso MD Ophthalmology       Primary Care Provider Office Phone # Fax #    Kaye Keene [phone]       [facility] [address] [address]        Further instructions from your care team       [facility] Discharge  Instructions  Anesthesia ([facility] Care Center)   Adult Discharge Instructions    For 24 hours after surgery    1. Get plenty of rest.  Make arrangements to have a responsible adult stay with you for at least 6 hours after you leave the hospital.  2. Do not drive or use heavy equipment for 24 hours.    3. Do not drink alcohol for 24 hours.  4. Do not sign legal documents or make important decisions for 24 hours.  5. Avoid strenuous or risky activities. You may feel lightheaded.  If so, sit for a few minutes before standing.  Have someone help you get up.   6. Conscious sedation patients may resume a regular diet..  7. Any questions of  medical nature, call your physician.        POST-OPERATIVE CARE FOLLOWING CATARACT SURGERY    DR. HOLLAND PONCE  [facility]  [phone]    Postoperative medications: After surgery, you will use several different eye drop medications. You may have either the brand specific form or generic of each type used.     Begin your eye drops today as directed.  You should instill the drop, close the eye without blinking and keep closed for 3 minutes allowing the drop to absorb.  It is fine to instill all 3 of the drops consecutively, waiting the 3 minutes in between each one. Place the shield for sleep.  In the morning, instill 1 drop of all 3 eye drops before your post-op appointment.      Antibiotic  Moxeza - is an antibiotic drop that is used to minimize the risk of infection. Instill your first drop at bedtime tonight, then 2 times daily for one week.       -OR-    Ofloxacin - this generic antibiotic is to be used 4 times a day for one week, you can start using this drop after you get home, instilling 3 separate times on the day of surgery and then 4 times a day there after.     Anti-inflammatory   Prolensa - use it once daily until gone, beginning today.    -OR-    Ketorolac -this generic drop should be used 4 times daily until gone. You can start your drops when you get home, instilling 3 separate times on the day of surgery.     Steroid  Durezol - is a steroid drop used to minimize inflammation and modulate healing.  It should be used 2 times daily until gone, beginning with your first drop at bedtime tonight.    -OR-    Prednisolone - the generic form should be used 4 times daily for 3 weeks or until gone. You can start your first drop after you get home, instilling 3 separate times on the day of surgery.        Do not rub the operated eye. Wear the eye shield during sleeping hours for one week.      Light sensitivity may be noticed. Sunglasses may be worn for comfort.      Some discomfort and irritation may  "be noticed. Acetaminophen (Tylenol) or Ibuprofen (Advil) may be taken for discomfort.      Avoid bending over, strenuous activity or heavy lifting for one week.      Keep the operated eye dry.      You may wash your hair, bathe or shower, but keep the operated eye closed while doing so.      Use medication exactly as prescribed by your doctor.  You may restart your regular home medications.      Bring all materials and medications to the clinic on your first post-operative visit.      Call the doctor s office if any of the following should occur:  -  any sudden vision change  -  nausea or severe headache  -  increase in pain not controlled  -  increased amount of floaters (black spots in front of vision)         -  or signs of infection (pus, increasing redness or tenderness)            Revised 12/10/2015    Pending Results     No orders found from 2017 to 2017.            Admission Information     Date & Time Provider Department Dept. Phone    5/3/2017 Judd Moscoso MD [facility] [phone]      Your Vitals Were     Blood Pressure Pulse Temperature Respirations Height Weight    147/94 63 98.4  F (36.9  C) (Temporal) 15 1.64 m (5' 4.57\") 61.7 kg (136 lb)    Pulse Oximetry BMI (Body Mass Index)                97% 22.94 kg/m2          MyChart Information     [employer] lets you send messages to your doctor, view your test results, renew your prescriptions, schedule appointments and more. To sign up, go to [email or web address] . Click on \"Log in\" on the left side of the screen, which will take you to the Welcome page. Then click on \"Sign up Now\" on the right side of the page.     You will be asked to enter the access code listed below, as well as some personal information. Please follow the directions to create your username and password.     Your access code is: [email or web address]  Expires: 2017  8:47 AM     Your access code will  in 90 days. If you need help or a new code, please " call your [facility] [place] clinic or [phone].        Care EveryWhere ID     This is your Care EveryWhere ID. This could be used by other organizations to access your [facility] [place] medical records  [id number]           Review of your medicines      UNREVIEWED medicines. Ask your doctor about these medicines        Dose / Directions    fluticasone 50 MCG/ACT spray   Commonly known as:  FLONASE        Dose:  2 spray   Spray 2 sprays into both nostrils daily   Refills:  0       HYDROCHLOROTHIAZIDE PO        Dose:  12.5 mg   Take 12.5 mg by mouth daily   Refills:  0       INDERAL LA PO        Dose:  60 mg   Take 60 mg by mouth daily   Refills:  0       SINEMET PO        Take by mouth 3 times daily   Refills:  0       SYNTHROID PO        Dose:  12.5 mcg   Take 12.5 mcg by mouth daily   Refills:  0                Protect others around you: Learn how to safely use, store and throw away your medicines at www.[email or web address].             Medication List: This is a list of all your medications and when to take them. Check marks below indicate your daily home schedule. Keep this list as a reference.      Medications           Morning Afternoon Evening Bedtime As Needed    fluticasone 50 MCG/ACT spray   Commonly known as:  FLONASE   Spray 2 sprays into both nostrils daily                                HYDROCHLOROTHIAZIDE PO   Take 12.5 mg by mouth daily                                INDERAL LA PO   Take 60 mg by mouth daily                                SINEMET PO   Take by mouth 3 times daily                                SYNTHROID PO   Take 12.5 mcg by mouth daily

## 2017-05-03 NOTE — DISCHARGE INSTRUCTIONS
[facility] Discharge  Instructions  Anesthesia (Eye Care Center)   Adult Discharge Instructions    For 24 hours after surgery    1. Get plenty of rest.  Make arrangements to have a responsible adult stay with you for at least 6 hours after you leave the hospital.  2. Do not drive or use heavy equipment for 24 hours.    3. Do not drink alcohol for 24 hours.  4. Do not sign legal documents or make important decisions for 24 hours.  5. Avoid strenuous or risky activities. You may feel lightheaded.  If so, sit for a few minutes before standing.  Have someone help you get up.   6. Conscious sedation patients may resume a regular diet..  7. Any questions of medical nature, call your physician.        POST-OPERATIVE CARE FOLLOWING CATARACT SURGERY    DR. HOLLAND PONCE  [facility]  [phone]    Postoperative medications: After surgery, you will use several different eye drop medications. You may have either the brand specific form or generic of each type used.     Begin your eye drops today as directed.  You should instill the drop, close the eye without blinking and keep closed for 3 minutes allowing the drop to absorb.  It is fine to instill all 3 of the drops consecutively, waiting the 3 minutes in between each one. Place the shield for sleep.  In the morning, instill 1 drop of all 3 eye drops before your post-op appointment.      Antibiotic  Moxeza - is an antibiotic drop that is used to minimize the risk of infection. Instill your first drop at bedtime tonight, then 2 times daily for one week.       -OR-    Ofloxacin - this generic antibiotic is to be used 4 times a day for one week, you can start using this drop after you get home, instilling 3 separate times on the day of surgery and then 4 times a day there after.     Anti-inflammatory   Prolensa - use it once daily until gone, beginning today.    -OR-    Ketorolac -this generic drop should be used 4 times daily until  gone. You can start your drops when you get home, instilling 3 separate times on the day of surgery.     Steroid  Durezol - is a steroid drop used to minimize inflammation and modulate healing.  It should be used 2 times daily until gone, beginning with your first drop at bedtime tonight.    -OR-    Prednisolone - the generic form should be used 4 times daily for 3 weeks or until gone. You can start your first drop after you get home, instilling 3 separate times on the day of surgery.        Do not rub the operated eye. Wear the eye shield during sleeping hours for one week.      Light sensitivity may be noticed. Sunglasses may be worn for comfort.      Some discomfort and irritation may be noticed. Acetaminophen (Tylenol) or Ibuprofen (Advil) may be taken for discomfort.      Avoid bending over, strenuous activity or heavy lifting for one week.      Keep the operated eye dry.      You may wash your hair, bathe or shower, but keep the operated eye closed while doing so.      Use medication exactly as prescribed by your doctor.  You may restart your regular home medications.      Bring all materials and medications to the clinic on your first post-operative visit.      Call the doctor s office if any of the following should occur:  -  any sudden vision change  -  nausea or severe headache  -  increase in pain not controlled  -  increased amount of floaters (black spots in front of vision)         -  or signs of infection (pus, increasing redness or tenderness)            Revised 12/10/2015

## 2017-05-03 NOTE — OR NURSING
Pt released to [facility], will go visit family on 4th floor.  VSS, no pain, drops returned to pt

## 2017-05-03 NOTE — ANESTHESIA CARE TRANSFER NOTE
Patient: Tracy Yee    Procedure(s):  LEFT EYE PHACOEMULSIFICATION CLEAR CORNEA WITH STANDARD INTRAOCULAR LENS IMPLANT  - Wound Class: I-Clean    Diagnosis: LEFT EYE CATARACT  Diagnosis Additional Information: No value filed.    Anesthesia Type:   MAC     Note:  Airway :Room Air  Patient transferred to:Phase II  Comments: VSS on Room Air. Talking and sitting up in chair. Report given to RN before transfer of pt care.      Vitals: (Last set prior to Anesthesia Care Transfer)    CRNA VITALS  5/3/2017 0804 - 5/3/2017 0836      5/3/2017             Resp Rate (set): 10                Electronically Signed By: ABI Huitron CRNA  May 3, 2017  8:36 AM

## 2017-05-03 NOTE — ANESTHESIA POSTPROCEDURE EVALUATION
Patient: Tracy Yee    Procedure(s):  LEFT EYE PHACOEMULSIFICATION CLEAR CORNEA WITH STANDARD INTRAOCULAR LENS IMPLANT  - Wound Class: I-Clean    Diagnosis:LEFT EYE CATARACT  Diagnosis Additional Information: No value filed.    Anesthesia Type:  MAC    Note:  Anesthesia Post Evaluation    Patient location during evaluation: PACU  Patient participation: Able to fully participate in evaluation  Level of consciousness: awake and alert  Pain management: adequate  Airway patency: patent  Cardiovascular status: acceptable  Respiratory status: acceptable  Hydration status: acceptable  PONV: none     Anesthetic complications: None          Last vitals:  Vitals:    05/03/17 0736 05/03/17 0835 05/03/17 0846   BP: (!) 147/94 123/56 100/66   Pulse: 63     Resp: 16 15 15   Temp: 36.9  C (98.4  F)     SpO2: 97% 98% 97%         Electronically Signed By: Parker Perkins MD  May 3, 2017  1:51 PM

## 2017-06-20 ENCOUNTER — HOSPITAL ENCOUNTER (OUTPATIENT)
Dept: MAMMOGRAPHY | Facility: CLINIC | Age: 72
Discharge: HOME | End: 2017-06-20
Attending: INTERNAL MEDICINE | Admitting: INTERNAL MEDICINE
Payer: COMMERCIAL

## 2017-06-20 DIAGNOSIS — Z12.31 VISIT FOR SCREENING MAMMOGRAM: ICD-10-CM

## 2017-06-20 PROCEDURE — G0202 SCR MAMMO BI INCL CAD: HCPCS

## 2017-07-13 ENCOUNTER — HOSPITAL ENCOUNTER (OUTPATIENT)
Dept: MAMMOGRAPHY | Facility: CLINIC | Age: 72
Discharge: HOME | End: 2017-07-13
Attending: INTERNAL MEDICINE | Admitting: INTERNAL MEDICINE
Payer: COMMERCIAL

## 2017-07-13 DIAGNOSIS — R92.8 ABNORMAL MAMMOGRAM: ICD-10-CM

## 2017-07-13 PROCEDURE — 76642 ULTRASOUND BREAST LIMITED: CPT | Mod: LT

## 2018-07-25 ENCOUNTER — HOSPITAL ENCOUNTER (OUTPATIENT)
Facility: CLINIC | Age: 73
Discharge: HOME | End: 2018-07-25
Attending: COLON & RECTAL SURGERY | Admitting: COLON & RECTAL SURGERY
Payer: COMMERCIAL

## 2018-07-25 ENCOUNTER — SURGERY (OUTPATIENT)
Age: 73
End: 2018-07-25

## 2018-07-25 VITALS
RESPIRATION RATE: 15 BRPM | WEIGHT: 130 LBS | SYSTOLIC BLOOD PRESSURE: 140 MMHG | OXYGEN SATURATION: 96 % | HEIGHT: 65 IN | BODY MASS INDEX: 21.66 KG/M2 | DIASTOLIC BLOOD PRESSURE: 91 MMHG

## 2018-07-25 PROBLEM — G20.A1 PARKINSON'S DISEASE (H): Status: ACTIVE | Noted: 2017-04-06

## 2018-07-25 PROBLEM — I10 HTN (HYPERTENSION): Status: ACTIVE | Noted: 2018-07-25

## 2018-07-25 PROBLEM — E03.9 HYPOTHYROIDISM: Status: ACTIVE | Noted: 2017-06-30

## 2018-07-25 LAB — COLONOSCOPY: NORMAL

## 2018-07-25 PROCEDURE — 45380 COLONOSCOPY AND BIOPSY: CPT | Performed by: COLON & RECTAL SURGERY

## 2018-07-25 PROCEDURE — G0500 MOD SEDAT ENDO SERVICE >5YRS: HCPCS | Performed by: COLON & RECTAL SURGERY

## 2018-07-25 PROCEDURE — 88305 TISSUE EXAM BY PATHOLOGIST: CPT | Mod: 26 | Performed by: COLON & RECTAL SURGERY

## 2018-07-25 PROCEDURE — 25000128 H RX IP 250 OP 636: Performed by: COLON & RECTAL SURGERY

## 2018-07-25 PROCEDURE — 88305 TISSUE EXAM BY PATHOLOGIST: CPT | Performed by: COLON & RECTAL SURGERY

## 2018-07-25 RX ORDER — ONDANSETRON 2 MG/ML
4 INJECTION INTRAMUSCULAR; INTRAVENOUS
Status: COMPLETED | OUTPATIENT
Start: 2018-07-25 | End: 2018-07-25

## 2018-07-25 RX ORDER — NALOXONE HYDROCHLORIDE 0.4 MG/ML
.1-.4 INJECTION, SOLUTION INTRAMUSCULAR; INTRAVENOUS; SUBCUTANEOUS
Status: DISCONTINUED | OUTPATIENT
Start: 2018-07-25 | End: 2018-07-25 | Stop reason: HOSPADM

## 2018-07-25 RX ORDER — DIPHENHYDRAMINE HYDROCHLORIDE 50 MG/ML
INJECTION INTRAMUSCULAR; INTRAVENOUS PRN
Status: DISCONTINUED | OUTPATIENT
Start: 2018-07-25 | End: 2018-07-25 | Stop reason: HOSPADM

## 2018-07-25 RX ORDER — ONDANSETRON 2 MG/ML
4 INJECTION INTRAMUSCULAR; INTRAVENOUS EVERY 6 HOURS PRN
Status: DISCONTINUED | OUTPATIENT
Start: 2018-07-25 | End: 2018-07-25 | Stop reason: HOSPADM

## 2018-07-25 RX ORDER — FLUMAZENIL 0.1 MG/ML
0.2 INJECTION, SOLUTION INTRAVENOUS
Status: DISCONTINUED | OUTPATIENT
Start: 2018-07-25 | End: 2018-07-25 | Stop reason: HOSPADM

## 2018-07-25 RX ORDER — LIDOCAINE 40 MG/G
CREAM TOPICAL
Status: DISCONTINUED | OUTPATIENT
Start: 2018-07-25 | End: 2018-07-25 | Stop reason: HOSPADM

## 2018-07-25 RX ORDER — FENTANYL CITRATE 50 UG/ML
INJECTION, SOLUTION INTRAMUSCULAR; INTRAVENOUS PRN
Status: DISCONTINUED | OUTPATIENT
Start: 2018-07-25 | End: 2018-07-25 | Stop reason: HOSPADM

## 2018-07-25 RX ORDER — ONDANSETRON 4 MG/1
4 TABLET, ORALLY DISINTEGRATING ORAL EVERY 6 HOURS PRN
Status: DISCONTINUED | OUTPATIENT
Start: 2018-07-25 | End: 2018-07-25 | Stop reason: HOSPADM

## 2018-07-25 RX ADMIN — FENTANYL CITRATE 100 MCG: 50 INJECTION, SOLUTION INTRAMUSCULAR; INTRAVENOUS at 10:52

## 2018-07-25 RX ADMIN — ONDANSETRON 4 MG: 2 INJECTION INTRAMUSCULAR; INTRAVENOUS at 10:52

## 2018-07-25 RX ADMIN — MIDAZOLAM 1 MG: 1 INJECTION INTRAMUSCULAR; INTRAVENOUS at 11:17

## 2018-07-25 RX ADMIN — MIDAZOLAM 1 MG: 1 INJECTION INTRAMUSCULAR; INTRAVENOUS at 11:03

## 2018-07-25 RX ADMIN — DIPHENHYDRAMINE HYDROCHLORIDE 25 MG: 50 INJECTION, SOLUTION INTRAMUSCULAR; INTRAVENOUS at 11:09

## 2018-07-25 RX ADMIN — MIDAZOLAM 1 MG: 1 INJECTION INTRAMUSCULAR; INTRAVENOUS at 10:52

## 2018-07-25 NOTE — BRIEF OP NOTE
[facility] Brief Operative Note    Pre-operative diagnosis: PERSONAL HISTORY OF POLYPS, FAMILY HISTORY OF COLORECTAL CANCER   Post-operative diagnosis colon polyps, diverticulosis     Procedure: Procedure(s):  COLONOSCOPY  - Wound Class: II-Clean Contaminated   Surgeon(s): Surgeon(s) and Role:     * Brittnee Fuentes MD - Primary   Estimated blood loss: * No values recorded between 7/25/2018 12:00 AM and 7/25/2018 11:32 AM *    Specimens:   ID Type Source Tests Collected by Time Destination   A : x2 Polyp Large Intestine, Right/Ascending SURGICAL PATHOLOGY EXAM Zainab Carmen, RN 7/25/2018 11:24 AM       Findings: See Provation procedure note in Epic

## 2018-07-25 NOTE — H&P
Pre-Endoscopy History and Physical     Tracy Yee MRN# [id number]   YOB: 1945 Age: 73 year old     Date of Procedure: 7/25/2018  Primary care provider: Kaye Keene  Type of Endoscopy: colonoscopy  Reason for Procedure: screening  Type of Anesthesia Anticipated: moderate sedation    HPI:    Tracy is a 73 year old female who will be undergoing the above procedure.  Patient had polyps removed during a colonoscopy in 2009.  Patient denies a change in her bowel habits or bleeding.     A history and physical has been performed. The patient's medications and allergies have been reviewed. The risks and benefits of the procedure and the sedation options and risks were discussed with the patient.  All questions were answered and informed consent was obtained.      She denies a personal or family history of anesthesia complications or bleeding disorders.   Prior to Admission medications    Medication Sig Start Date End Date Taking? Authorizing Provider   Carbidopa-Levodopa (SINEMET PO) Take by mouth 3 times daily   Yes Reported, Patient   Levothyroxine Sodium (SYNTHROID PO) Take 12.5 mcg by mouth daily   Yes Reported, Patient       Allergies   Allergen Reactions     Sulfa Drugs      Sulfonamide antibiotics        No current facility-administered medications for this encounter.        Patient Active Problem List   Diagnosis     Hypothyroidism     Tubular adenoma of colon     Prolapse of uterus     Prediabetes     Parkinson's disease (H)     Osteopenia     HTN (hypertension)     Malignant neoplasm of breast (H)        Past Medical History:   Diagnosis Date     Breast cancer (H)     breast lumpectomy     Hypertension      Osteopenia      Parkinson's disease (H)      Prediabetes      Thyroid disease     hypothyroidism        Past Surgical History:   Procedure Laterality Date     BREAST SURGERY      lumpectomy     COLONOSCOPY       GYN SURGERY      hysterectomy     PHACOEMULSIFICATION CLEAR CORNEA WITH  "STANDARD INTRAOCULAR LENS IMPLANT Right 4/26/2017    Procedure: PHACOEMULSIFICATION CLEAR CORNEA WITH STANDARD INTRAOCULAR LENS IMPLANT;  RIGHT EYE PHACOEMULSIFICATION CLEAR CORNEA WITH STANDARD INTRAOCULAR LENS IMPLANT ;  Surgeon: Judd Moscoso MD;  Location: [facility]     PHACOEMULSIFICATION CLEAR CORNEA WITH STANDARD INTRAOCULAR LENS IMPLANT Left 5/3/2017    Procedure: PHACOEMULSIFICATION CLEAR CORNEA WITH STANDARD INTRAOCULAR LENS IMPLANT;  LEFT EYE PHACOEMULSIFICATION CLEAR CORNEA WITH STANDARD INTRAOCULAR LENS IMPLANT ;  Surgeon: Judd Moscoso MD;  Location: [facility]       Social History   Substance Use Topics     Smoking status: Former Smoker     Quit date: 1/1/1975     Smokeless tobacco: Never Used     Alcohol use Yes      Comment: 3-5/week wine       Family History   Problem Relation Age of Onset     Cerebrovascular Disease Mother      Colon Cancer Father      Other Cancer Father        REVIEW OF SYSTEMS:     5 point ROS negative except as noted above in HPI, including Gen., Resp., CV, GI &  system review. Mobility changes      PHYSICAL EXAM:   BP (!) 157/91  Resp 16  Ht 1.651 m (5' 5\")  Wt 59 kg (130 lb)  SpO2 98%  BMI 21.63 kg/m2 Estimated body mass index is 21.63 kg/(m^2) as calculated from the following:    Height as of this encounter: 1.651 m (5' 5\").    Weight as of this encounter: 59 kg (130 lb).   GENERAL APPEARANCE: healthy  MENTAL STATUS: alert  AIRWAY EXAM: Mallampatti Class III (visualization of the soft palate and base of uvula)  RESP: lungs clear to auscultation - no rales, rhonchi or wheezes  CV: regular rates and rhythm      DIAGNOSTICS:    Not indicated      IMPRESSION   ASA Class 2 - Mild systemic disease        PLAN:       Colonoscopy with possible polypectomy, possible biopsy. The indications, procedure and risks were explained to the patient who agrees to proceed.       The above has been forwarded to the consulting provider.      Signed Electronically by: Brittnee Rudd " Gina  July 25, 2018

## 2018-07-26 LAB — COPATH REPORT: NORMAL

## 2019-10-23 ENCOUNTER — HOSPITAL ENCOUNTER (OUTPATIENT)
Dept: MAMMOGRAPHY | Facility: CLINIC | Age: 74
Discharge: HOME | End: 2019-10-23
Attending: INTERNAL MEDICINE | Admitting: INTERNAL MEDICINE
Payer: COMMERCIAL

## 2019-10-23 DIAGNOSIS — Z12.31 VISIT FOR SCREENING MAMMOGRAM: ICD-10-CM

## 2019-10-23 PROCEDURE — 77067 SCR MAMMO BI INCL CAD: CPT

## 2022-03-18 ENCOUNTER — HOSPITAL ENCOUNTER (OUTPATIENT)
Dept: ULTRASOUND IMAGING | Facility: CLINIC | Age: 77
Discharge: HOME | End: 2022-03-18
Attending: INTERNAL MEDICINE | Admitting: INTERNAL MEDICINE
Payer: COMMERCIAL

## 2022-03-18 DIAGNOSIS — N28.9 NEPHROPATHY: ICD-10-CM

## 2022-03-18 DIAGNOSIS — N18.4 CHRONIC KIDNEY DISEASE, STAGE IV (SEVERE) (H): ICD-10-CM

## 2022-03-18 PROCEDURE — 76770 US EXAM ABDO BACK WALL COMP: CPT

## 2022-04-12 ENCOUNTER — HOSPITAL ENCOUNTER (EMERGENCY)
Facility: CLINIC | Age: 77
Discharge: HOME | End: 2022-04-12
Attending: EMERGENCY MEDICINE | Admitting: EMERGENCY MEDICINE
Payer: COMMERCIAL

## 2022-04-12 ENCOUNTER — APPOINTMENT (OUTPATIENT)
Dept: CT IMAGING | Facility: CLINIC | Age: 77
End: 2022-04-12
Attending: EMERGENCY MEDICINE
Payer: COMMERCIAL

## 2022-04-12 VITALS
WEIGHT: 100 LBS | OXYGEN SATURATION: 97 % | HEIGHT: 64 IN | SYSTOLIC BLOOD PRESSURE: 173 MMHG | RESPIRATION RATE: 16 BRPM | TEMPERATURE: 98.9 F | DIASTOLIC BLOOD PRESSURE: 102 MMHG | HEART RATE: 69 BPM | BODY MASS INDEX: 17.07 KG/M2
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
## 2022-04-12 DIAGNOSIS — S09.90XA CLOSED HEAD INJURY, INITIAL ENCOUNTER: ICD-10-CM

## 2022-04-12 PROCEDURE — 99284 EMERGENCY DEPT VISIT MOD MDM: CPT | Mod: 25

## 2022-04-12 PROCEDURE — 70450 CT HEAD/BRAIN W/O DYE: CPT

## 2022-04-12 ASSESSMENT — ENCOUNTER SYMPTOMS
ROS SKIN COMMENTS: BRUISE
ABDOMINAL PAIN: 0
SHORTNESS OF BREATH: 0

## 2022-04-12 NOTE — ED PROVIDER NOTES
"  History   Chief Complaint:  Fall       The history is provided by the patient.      Tracy Yee is a 76 year old female with history of Parkinson's who presents with fall. Around 2300 last night Tracy attempted to sit down, but lost balance and missed the chair, falling forward and hitting the right side of her head. She presents to the ED for evaluation for a bruise resulting from the fall, expressing concern for the had trauma. No neck pain, headache. She does not take any blood thinners.     Review of Systems   Respiratory: Negative for shortness of breath.    Cardiovascular: Negative for chest pain.   Gastrointestinal: Negative for abdominal pain.   Skin:        Bruise   All other systems reviewed and are negative.    Allergies:  Sulfa Drugs    Medications:  Carbidopa-Levodopa   Levothyroxine Sodium     Past Medical History:     Breast cancer  Hypertension  Osteopenia  Parkinson's disease  Prediabetes  Thyroid disease  Hypothyroidism  Tubular adenoma of colon  Prolapse of uterus  Malignant neoplasm of breast    Past Surgical History:    Lumpectomy  Colonoscopy   Hysterectomy  Phacoemulsification clear cornea with standard intraocular lens implant    Family History:    Cerebrovascular disease  Colon cancer    Social History:  Patient unaccompanied  PCP: Nilam Dunn     Physical Exam     Patient Vitals for the past 24 hrs:   BP Temp Temp src Pulse Resp SpO2 Height Weight   04/12/22 0230 (!) 173/102 -- -- 69 -- 97 % -- --   04/12/22 0215 (!) 172/107 -- -- 73 -- 97 % -- --   04/12/22 0057 136/80 98.9  F (37.2  C) Temporal 78 16 96 % 1.626 m (5' 4\") 45.4 kg (100 lb)       Physical Exam  General: Appears well-developed and well-nourished.   Head: Ecchymosis and mild swelling above right eye.  Mouth/Throat: Oropharynx is clear and moist.   Eyes: Conjunctivae are normal. Pupils are equal, round, and reactive to light.   Neck: Normal range of motion. No tenderness.  CV: Normal rate and regular rhythm.    Resp: " Effort normal and breath sounds normal. No respiratory distress.   GI: Soft. There is no tenderness.  No rebound or guarding.  Normal bowel sounds.   MSK: Normal range of motion. No tenderness to extremities.    Neuro: The patient is alert and oriented to person, place, and time.  PERRLA, EOMI, strength in upper/lower extremities normal and symmetrical. Sensation normal. Speech normal.  GCS 15  Skin: Skin is warm and dry. No rash noted.   Psych: normal mood and affect. behavior is normal.       Emergency Department Course     Imaging:  Head CT w/o contrast   Final Result   IMPRESSION:   1.  Right frontal scalp swelling. No acute intracranial hemorrhage or calvarial fracture.      2.  Age-related change.        Report per radiology      Emergency Department Course:           Reviewed:  I reviewed nursing notes, vitals, past medical history and Care Everywhere    Assessments/Consults:  ED Course as of 04/12/22 0523 Tue Apr 12, 2022   0234 Obtained history and examined the patient as noted above.       Disposition:  The patient was discharged to home.     Impression & Plan     Medical Decision Making:  Tracy Yee is a 76-year-old woman presents after a fall.  She was going to sit down and apparently partially missed the chair causing her to fall forward striking the right side of her head.  She denied any other injuries.  On my evaluation she did have ecchymosis above the right eye.  CT scan abdomen was obtained this did not show any signs of intracranial process.  Patient was discharged home with family with recommendation for supportive care. Provided instructions to return for any further concerns    Diagnosis:    ICD-10-CM    1. Closed head injury, initial encounter  S09.90XA    2. Fall from chair, initial encounter  W07.XXXA        Scribe Disclosure:  Tony GRANT, am serving as a scribe at 2:34 AM on 4/12/2022 to document services personally performed by Parker Browning MD based on my  observations and the provider's statements to me.           Parker Browning MD  04/12/22 0709

## 2022-04-12 NOTE — ED TRIAGE NOTES
Tried to sit on the chair and lost her balance and fell.  Hit the back of her head.  No LOC.  No blood thinners.

## 2023-05-14 ENCOUNTER — APPOINTMENT (OUTPATIENT)
Dept: CT IMAGING | Facility: CLINIC | Age: 78
DRG: 871 | End: 2023-05-14
Attending: EMERGENCY MEDICINE
Payer: COMMERCIAL

## 2023-05-14 ENCOUNTER — HOSPITAL ENCOUNTER (INPATIENT)
Facility: CLINIC | Age: 78
LOS: 4 days | Discharge: NURSING, REHAB OR LONG-TERM CARE | DRG: 871 | End: 2023-05-18
Attending: EMERGENCY MEDICINE | Admitting: INTERNAL MEDICINE
Payer: COMMERCIAL

## 2023-05-14 DIAGNOSIS — N30.00 ACUTE CYSTITIS WITHOUT HEMATURIA: ICD-10-CM

## 2023-05-14 DIAGNOSIS — I10 HYPERTENSION, UNSPECIFIED TYPE: Primary | ICD-10-CM

## 2023-05-14 DIAGNOSIS — G31.83 SEVERE LEWY BODY DEMENTIA, UNSPECIFIED WHETHER BEHAVIORAL, PSYCHOTIC, OR MOOD DISTURBANCE OR ANXIETY (H): ICD-10-CM

## 2023-05-14 DIAGNOSIS — M62.81 GENERALIZED MUSCLE WEAKNESS: ICD-10-CM

## 2023-05-14 DIAGNOSIS — F02.C0 SEVERE LEWY BODY DEMENTIA, UNSPECIFIED WHETHER BEHAVIORAL, PSYCHOTIC, OR MOOD DISTURBANCE OR ANXIETY (H): ICD-10-CM

## 2023-05-14 LAB
ALBUMIN SERPL BCG-MCNC: 4.1 G/DL (ref 3.5–5.2)
ALBUMIN UR-MCNC: 70 MG/DL
ALP SERPL-CCNC: 77 U/L (ref 35–104)
ALT SERPL W P-5'-P-CCNC: 15 U/L (ref 10–35)
ANION GAP SERPL CALCULATED.3IONS-SCNC: 12 MMOL/L (ref 7–15)
APPEARANCE UR: ABNORMAL
AST SERPL W P-5'-P-CCNC: 62 U/L (ref 10–35)
ATRIAL RATE - MUSE: 76 BPM
BACTERIA #/AREA URNS HPF: ABNORMAL /HPF
BASOPHILS # BLD AUTO: 0 10E3/UL (ref 0–0.2)
BASOPHILS NFR BLD AUTO: 0 %
BILIRUB SERPL-MCNC: 0.7 MG/DL
BILIRUB UR QL STRIP: NEGATIVE
BUN SERPL-MCNC: 19.6 MG/DL (ref 8–23)
CALCIUM SERPL-MCNC: 9 MG/DL (ref 8.8–10.2)
CHLORIDE SERPL-SCNC: 99 MMOL/L (ref 98–107)
COLOR UR AUTO: ABNORMAL
CREAT SERPL-MCNC: 1.13 MG/DL (ref 0.51–0.95)
DEPRECATED HCO3 PLAS-SCNC: 25 MMOL/L (ref 22–29)
DIASTOLIC BLOOD PRESSURE - MUSE: NORMAL MMHG
EOSINOPHIL # BLD AUTO: 0 10E3/UL (ref 0–0.7)
EOSINOPHIL NFR BLD AUTO: 0 %
ERYTHROCYTE [DISTWIDTH] IN BLOOD BY AUTOMATED COUNT: 13.1 % (ref 10–15)
GFR SERPL CREATININE-BSD FRML MDRD: 49 ML/MIN/1.73M2
GLUCOSE SERPL-MCNC: 128 MG/DL (ref 70–99)
GLUCOSE UR STRIP-MCNC: NEGATIVE MG/DL
HCO3 BLDV-SCNC: 27 MMOL/L (ref 21–28)
HCT VFR BLD AUTO: 34.3 % (ref 35–47)
HGB BLD-MCNC: 11.4 G/DL (ref 11.7–15.7)
HGB UR QL STRIP: ABNORMAL
HOLD SPECIMEN: NORMAL
HOLD SPECIMEN: NORMAL
IMM GRANULOCYTES # BLD: 0 10E3/UL
IMM GRANULOCYTES NFR BLD: 1 %
INTERPRETATION ECG - MUSE: NORMAL
KETONES UR STRIP-MCNC: 10 MG/DL
LACTATE BLD-SCNC: 1.3 MMOL/L
LEUKOCYTE ESTERASE UR QL STRIP: ABNORMAL
LYMPHOCYTES # BLD AUTO: 0.2 10E3/UL (ref 0.8–5.3)
LYMPHOCYTES NFR BLD AUTO: 3 %
MCH RBC QN AUTO: 32 PG (ref 26.5–33)
MCHC RBC AUTO-ENTMCNC: 33.2 G/DL (ref 31.5–36.5)
MCV RBC AUTO: 96 FL (ref 78–100)
MONOCYTES # BLD AUTO: 0.6 10E3/UL (ref 0–1.3)
MONOCYTES NFR BLD AUTO: 8 %
MUCOUS THREADS #/AREA URNS LPF: PRESENT /LPF
NEUTROPHILS # BLD AUTO: 6.4 10E3/UL (ref 1.6–8.3)
NEUTROPHILS NFR BLD AUTO: 88 %
NITRATE UR QL: NEGATIVE
NRBC # BLD AUTO: 0 10E3/UL
NRBC BLD AUTO-RTO: 0 /100
P AXIS - MUSE: 71 DEGREES
PCO2 BLDV: 43 MM HG (ref 40–50)
PH BLDV: 7.4 [PH] (ref 7.32–7.43)
PH UR STRIP: 6 [PH] (ref 5–7)
PLATELET # BLD AUTO: 154 10E3/UL (ref 150–450)
PO2 BLDV: 20 MM HG (ref 25–47)
POTASSIUM SERPL-SCNC: 3.8 MMOL/L (ref 3.4–5.3)
PR INTERVAL - MUSE: 174 MS
PROCALCITONIN SERPL IA-MCNC: 8.42 NG/ML
PROT SERPL-MCNC: 6.9 G/DL (ref 6.4–8.3)
QRS DURATION - MUSE: 88 MS
QT - MUSE: 378 MS
QTC - MUSE: 425 MS
R AXIS - MUSE: -25 DEGREES
RBC # BLD AUTO: 3.56 10E6/UL (ref 3.8–5.2)
RBC URINE: 6 /HPF
SAO2 % BLDV: 31 % (ref 94–100)
SODIUM SERPL-SCNC: 136 MMOL/L (ref 136–145)
SP GR UR STRIP: 1.01 (ref 1–1.03)
SYSTOLIC BLOOD PRESSURE - MUSE: NORMAL MMHG
T AXIS - MUSE: 71 DEGREES
UROBILINOGEN UR STRIP-MCNC: NORMAL MG/DL
VENTRICULAR RATE- MUSE: 76 BPM
WBC # BLD AUTO: 7.2 10E3/UL (ref 4–11)
WBC URINE: 111 /HPF

## 2023-05-14 PROCEDURE — 99285 EMERGENCY DEPT VISIT HI MDM: CPT | Mod: 25

## 2023-05-14 PROCEDURE — 70498 CT ANGIOGRAPHY NECK: CPT

## 2023-05-14 PROCEDURE — 36415 COLL VENOUS BLD VENIPUNCTURE: CPT | Performed by: EMERGENCY MEDICINE

## 2023-05-14 PROCEDURE — 84145 PROCALCITONIN (PCT): CPT | Performed by: EMERGENCY MEDICINE

## 2023-05-14 PROCEDURE — 80053 COMPREHEN METABOLIC PANEL: CPT | Performed by: EMERGENCY MEDICINE

## 2023-05-14 PROCEDURE — 250N000011 HC RX IP 250 OP 636: Performed by: EMERGENCY MEDICINE

## 2023-05-14 PROCEDURE — 85025 COMPLETE CBC W/AUTO DIFF WBC: CPT | Performed by: EMERGENCY MEDICINE

## 2023-05-14 PROCEDURE — 250N000009 HC RX 250: Performed by: EMERGENCY MEDICINE

## 2023-05-14 PROCEDURE — 250N000013 HC RX MED GY IP 250 OP 250 PS 637: Performed by: INTERNAL MEDICINE

## 2023-05-14 PROCEDURE — 120N000001 HC R&B MED SURG/OB

## 2023-05-14 PROCEDURE — 81001 URINALYSIS AUTO W/SCOPE: CPT | Performed by: EMERGENCY MEDICINE

## 2023-05-14 PROCEDURE — 258N000003 HC RX IP 258 OP 636: Performed by: EMERGENCY MEDICINE

## 2023-05-14 PROCEDURE — 87186 SC STD MICRODIL/AGAR DIL: CPT | Performed by: EMERGENCY MEDICINE

## 2023-05-14 PROCEDURE — 87149 DNA/RNA DIRECT PROBE: CPT | Performed by: EMERGENCY MEDICINE

## 2023-05-14 PROCEDURE — 70496 CT ANGIOGRAPHY HEAD: CPT

## 2023-05-14 PROCEDURE — 87077 CULTURE AEROBIC IDENTIFY: CPT | Performed by: EMERGENCY MEDICINE

## 2023-05-14 PROCEDURE — 93005 ELECTROCARDIOGRAM TRACING: CPT

## 2023-05-14 PROCEDURE — 99222 1ST HOSP IP/OBS MODERATE 55: CPT | Performed by: INTERNAL MEDICINE

## 2023-05-14 PROCEDURE — 96365 THER/PROPH/DIAG IV INF INIT: CPT | Mod: 59

## 2023-05-14 PROCEDURE — 83605 ASSAY OF LACTIC ACID: CPT

## 2023-05-14 PROCEDURE — 82803 BLOOD GASES ANY COMBINATION: CPT

## 2023-05-14 PROCEDURE — 70450 CT HEAD/BRAIN W/O DYE: CPT

## 2023-05-14 RX ORDER — RIVASTIGMINE TARTRATE 3 MG/1
3 CAPSULE ORAL 2 TIMES DAILY WITH MEALS
COMMUNITY

## 2023-05-14 RX ORDER — RIVASTIGMINE TARTRATE 1.5 MG/1
3 CAPSULE ORAL 2 TIMES DAILY WITH MEALS
Status: DISCONTINUED | OUTPATIENT
Start: 2023-05-15 | End: 2023-05-18 | Stop reason: HOSPADM

## 2023-05-14 RX ORDER — RIVASTIGMINE TARTRATE 1.5 MG/1
3 CAPSULE ORAL ONCE
Status: COMPLETED | OUTPATIENT
Start: 2023-05-14 | End: 2023-05-14

## 2023-05-14 RX ORDER — CEFTRIAXONE 1 G/1
1 INJECTION, POWDER, FOR SOLUTION INTRAMUSCULAR; INTRAVENOUS ONCE
Status: COMPLETED | OUTPATIENT
Start: 2023-05-14 | End: 2023-05-14

## 2023-05-14 RX ORDER — AMOXICILLIN 250 MG
1 CAPSULE ORAL 2 TIMES DAILY PRN
Status: DISCONTINUED | OUTPATIENT
Start: 2023-05-14 | End: 2023-05-18 | Stop reason: HOSPADM

## 2023-05-14 RX ORDER — PROCHLORPERAZINE 25 MG
12.5 SUPPOSITORY, RECTAL RECTAL EVERY 12 HOURS PRN
Status: DISCONTINUED | OUTPATIENT
Start: 2023-05-14 | End: 2023-05-17

## 2023-05-14 RX ORDER — LEVOTHYROXINE SODIUM 75 UG/1
75 TABLET ORAL DAILY
Status: DISCONTINUED | OUTPATIENT
Start: 2023-05-15 | End: 2023-05-18 | Stop reason: HOSPADM

## 2023-05-14 RX ORDER — LIDOCAINE 40 MG/G
CREAM TOPICAL
Status: DISCONTINUED | OUTPATIENT
Start: 2023-05-14 | End: 2023-05-18 | Stop reason: HOSPADM

## 2023-05-14 RX ORDER — IOPAMIDOL 755 MG/ML
75 INJECTION, SOLUTION INTRAVASCULAR ONCE
Status: COMPLETED | OUTPATIENT
Start: 2023-05-14 | End: 2023-05-14

## 2023-05-14 RX ORDER — ACETAMINOPHEN 650 MG/1
650 SUPPOSITORY RECTAL EVERY 6 HOURS PRN
Status: DISCONTINUED | OUTPATIENT
Start: 2023-05-14 | End: 2023-05-18 | Stop reason: HOSPADM

## 2023-05-14 RX ORDER — LEVOTHYROXINE SODIUM 75 UG/1
75 TABLET ORAL DAILY
COMMUNITY

## 2023-05-14 RX ORDER — ONDANSETRON 4 MG/1
4 TABLET, ORALLY DISINTEGRATING ORAL EVERY 6 HOURS PRN
Status: DISCONTINUED | OUTPATIENT
Start: 2023-05-14 | End: 2023-05-18 | Stop reason: HOSPADM

## 2023-05-14 RX ORDER — CEFTRIAXONE 1 G/1
1 INJECTION, POWDER, FOR SOLUTION INTRAMUSCULAR; INTRAVENOUS EVERY 24 HOURS
Status: DISCONTINUED | OUTPATIENT
Start: 2023-05-15 | End: 2023-05-18 | Stop reason: HOSPADM

## 2023-05-14 RX ORDER — POLYETHYLENE GLYCOL 3350 17 G/17G
17 POWDER, FOR SOLUTION ORAL DAILY PRN
Status: DISCONTINUED | OUTPATIENT
Start: 2023-05-14 | End: 2023-05-18 | Stop reason: HOSPADM

## 2023-05-14 RX ORDER — AMOXICILLIN 250 MG
2 CAPSULE ORAL 2 TIMES DAILY PRN
Status: DISCONTINUED | OUTPATIENT
Start: 2023-05-14 | End: 2023-05-18 | Stop reason: HOSPADM

## 2023-05-14 RX ORDER — ACETAMINOPHEN 325 MG/1
650 TABLET ORAL EVERY 6 HOURS PRN
Status: DISCONTINUED | OUTPATIENT
Start: 2023-05-14 | End: 2023-05-18 | Stop reason: HOSPADM

## 2023-05-14 RX ORDER — CARBIDOPA AND LEVODOPA 25; 100 MG/1; MG/1
1 TABLET ORAL 2 TIMES DAILY PRN
Status: DISCONTINUED | OUTPATIENT
Start: 2023-05-14 | End: 2023-05-18 | Stop reason: HOSPADM

## 2023-05-14 RX ORDER — PROCHLORPERAZINE MALEATE 5 MG
5 TABLET ORAL EVERY 6 HOURS PRN
Status: DISCONTINUED | OUTPATIENT
Start: 2023-05-14 | End: 2023-05-17

## 2023-05-14 RX ORDER — ACETAMINOPHEN 500 MG
500 TABLET ORAL EVERY 4 HOURS PRN
COMMUNITY

## 2023-05-14 RX ORDER — CARBIDOPA AND LEVODOPA 25; 100 MG/1; MG/1
1 TABLET ORAL
COMMUNITY

## 2023-05-14 RX ORDER — CARBIDOPA AND LEVODOPA 25; 100 MG/1; MG/1
1 TABLET ORAL
Status: DISCONTINUED | OUTPATIENT
Start: 2023-05-15 | End: 2023-05-18 | Stop reason: HOSPADM

## 2023-05-14 RX ORDER — CARBIDOPA AND LEVODOPA 25; 100 MG/1; MG/1
1 TABLET ORAL ONCE
Status: COMPLETED | OUTPATIENT
Start: 2023-05-14 | End: 2023-05-14

## 2023-05-14 RX ORDER — ONDANSETRON 2 MG/ML
4 INJECTION INTRAMUSCULAR; INTRAVENOUS EVERY 6 HOURS PRN
Status: DISCONTINUED | OUTPATIENT
Start: 2023-05-14 | End: 2023-05-18 | Stop reason: HOSPADM

## 2023-05-14 RX ORDER — CARBIDOPA AND LEVODOPA 25; 100 MG/1; MG/1
1 TABLET ORAL 2 TIMES DAILY PRN
COMMUNITY

## 2023-05-14 RX ADMIN — SODIUM CHLORIDE 1000 ML: 9 INJECTION, SOLUTION INTRAVENOUS at 20:34

## 2023-05-14 RX ADMIN — SODIUM CHLORIDE 90 ML: 9 INJECTION, SOLUTION INTRAVENOUS at 18:51

## 2023-05-14 RX ADMIN — CEFTRIAXONE SODIUM 1 G: 1 INJECTION, POWDER, FOR SOLUTION INTRAMUSCULAR; INTRAVENOUS at 20:12

## 2023-05-14 RX ADMIN — IOPAMIDOL 75 ML: 755 INJECTION, SOLUTION INTRAVENOUS at 18:51

## 2023-05-14 RX ADMIN — ACETAMINOPHEN 650 MG: 650 SUPPOSITORY RECTAL at 22:36

## 2023-05-14 ASSESSMENT — ACTIVITIES OF DAILY LIVING (ADL)
ADLS_ACUITY_SCORE: 35

## 2023-05-14 NOTE — ED PROVIDER NOTES
History   Chief Complaint:  Altered Mental Status     The history is provided by the spouse and the EMS personnel. The history is limited by the condition of the patient.      Tracy Yee is a 78 year old female with a history of dementia and Parkinson's disease who presents with altered mental status. EMS reports that the patient lives independently with her . Her  reports that the patient was baseline yesterday, however this morning around 0830, she was unable to ambulate without assistance and was disoriented to everything but herself.  states that although the patient's mentation was baseline, she did complain of nausea and feeling ill yesterday afternoon. EMS notes that the patient has history of dementia and Parkinson's, however she is not normally confused.  adds that the patient has history of UTIs.    Independent Historian:   Spouse/Partner - They report above history and EMS - They report supplemental history.    Review of External Notes: Urology note 5/3/2023, negative dipstick for UTI.    ROS:  Review of Systems   Unable to perform ROS: Mental status change     Allergies:  Sulfa Antibiotics     Medications:    Levothyroxine  Rivastigmine   Carbidopa-levodopa     Past Medical History:    Breast cancer  Hypertension  Osteopenia  Parkinson's disease  Thyroid disease    Past Surgical History:    Lumpectomy  Hysterectomy     Family History:    Mother - cerebrovascular disease  Father - colon cancer    Social History:  Presents with      PCP: Nilam Dunn     Physical Exam     Patient Vitals for the past 24 hrs:   BP Temp Temp src Pulse Resp SpO2   05/14/23 2006 (!) 157/95 -- -- -- 18 97 %   05/14/23 1937 -- 99.4  F (37.4  C) Oral -- -- --   05/14/23 1936 (!) 163/94 -- -- -- -- (!) 87 %   05/14/23 1747 (!) 138/97 98.5  F (36.9  C) Temporal 89 18 96 %        Physical Exam  Constitutional: Alert, attentive, pleasantly demented, able to follow commands, but not  verbal, laughing, GCS 15  HENT:    Nose: Nose normal.    Mouth/Throat: Oropharynx is clear, mucous membranes are moist  Eyes: EOM are normal, anicteric, conjugate gaze  CV: regular rate and rhythm; no murmurs  Chest: Effort normal and breath sounds clear without wheezing or rales, symmetric bilaterally   GI:  non tender. No distension. No guarding or rebound.    MSK: No LE edema, no tenderness to palpation of BLE.  Neurological: Alert, attentive, moving all extremities equally.   Skin: Skin is warm and dry.    Emergency Department Course   ECG  ECG results from 05/14/23 @ 1945   EKG 12 lead     Value    Systolic Blood Pressure     Diastolic Blood Pressure     Ventricular Rate 76    Atrial Rate 76    MS Interval 174    QRS Duration 88        QTc 425    P Axis 71    R AXIS -25    T Axis 71    Interpretation ECG      Sinus rhythm  Possible Left atrial enlargement  Left ventricular hypertrophy ( R in aVL , [place] product )  Nonspecific T wave abnormality         Imaging:  CTA Head Neck with Contrast   Final Result   IMPRESSION:    HEAD CT:   1.  No CT evidence for acute intracranial process.   2.  Brain atrophy and presumed chronic microvascular ischemic changes as above.      HEAD CTA:    1.  No significant stenosis, aneurysm, or high flow vascular malformation identified.      NECK CTA:   1.  No hemodynamically significant stenosis in the neck vessels.    2.  No evidence for dissection.      Head CT w/o contrast   Final Result   IMPRESSION:    HEAD CT:   1.  No CT evidence for acute intracranial process.   2.  Brain atrophy and presumed chronic microvascular ischemic changes as above.      HEAD CTA:    1.  No significant stenosis, aneurysm, or high flow vascular malformation identified.      NECK CTA:   1.  No hemodynamically significant stenosis in the neck vessels.    2.  No evidence for dissection.         Report per radiology    Laboratory:  Labs Ordered and Resulted from Time of ED Arrival to Time of ED  Departure   COMPREHENSIVE METABOLIC PANEL - Abnormal       Result Value    Sodium 136      Potassium 3.8      Chloride 99      Carbon Dioxide (CO2) 25      Anion Gap 12      Urea Nitrogen 19.6      Creatinine 1.13 (*)     Calcium 9.0      Glucose 128 (*)     Alkaline Phosphatase 77      AST 62 (*)     ALT 15      Protein Total 6.9      Albumin 4.1      Bilirubin Total 0.7      GFR Estimate 49 (*)    ROUTINE UA WITH MICROSCOPIC - Abnormal    Color Urine Straw      Appearance Urine Slightly Cloudy (*)     Glucose Urine Negative      Bilirubin Urine Negative      Ketones Urine 10 (*)     Specific Gravity Urine 1.014      Blood Urine Large (*)     pH Urine 6.0      Protein Albumin Urine 70 (*)     Urobilinogen Urine Normal      Nitrite Urine Negative      Leukocyte Esterase Urine Moderate (*)     Bacteria Urine Few (*)     Mucus Urine Present (*)     RBC Urine 6 (*)     WBC Urine 111 (*)    PROCALCITONIN - Abnormal    Procalcitonin 8.42 (*)    CBC WITH PLATELETS AND DIFFERENTIAL - Abnormal    WBC Count 7.2      RBC Count 3.56 (*)     Hemoglobin 11.4 (*)     Hematocrit 34.3 (*)     MCV 96      MCH 32.0      MCHC 33.2      RDW 13.1      Platelet Count 154      % Neutrophils 88      % Lymphocytes 3      % Monocytes 8      % Eosinophils 0      % Basophils 0      % Immature Granulocytes 1      NRBCs per 100 WBC 0      Absolute Neutrophils 6.4      Absolute Lymphocytes 0.2 (*)     Absolute Monocytes 0.6      Absolute Eosinophils 0.0      Absolute Basophils 0.0      Absolute Immature Granulocytes 0.0      Absolute NRBCs 0.0     ISTAT GASES LACTATE VENOUS POCT - Abnormal    Lactic Acid POCT 1.3      Bicarbonate Venous POCT 27      O2 Sat, Venous POCT 31 (*)     pCO2V Venous POCT 43      pH Venous POCT 7.40      pO2 Venous POCT 20 (*)    BLOOD CULTURE   BLOOD CULTURE   URINE CULTURE      Emergency Department Course & Assessments:     Interventions:  Medications   carbidopa-levodopa (SINEMET)  MG per tablet 1 tablet (has no  administration in time range)   rivastigmine (EXELON) capsule 3 mg (has no administration in time range)   cefTRIAXone (ROCEPHIN) 1 g vial to attach to  mL bag for ADULTS or NS 50 mL bag for PEDS (0 g Intravenous Stopped 23)   Saline (90 mLs As instructed $Given 23)   iopamidol (ISOVUE-370) solution 75 mL (75 mLs Intravenous $Given 23)   0.9% sodium chloride BOLUS (1,000 mLs Intravenous $New Bag 23)        Assessments:   I obtained history and examined the patient, as above.   I rechecked the patient and updated them on findings.     Independent Interpretation (X-rays, CTs, rhythm strip):  I personally reviewed her head CT, see no evidence of intracranial hemorrhage    Consultations/Discussion of Management or Tests:   I spoke to Dr. Cabral, hospitalist, who accepts admission.     Social Determinants of Health affecting care:   None    Disposition:  The patient was admitted to the hospital under the care of Dr. Cabral.     Impression & Plan    Medical Decision Makin-year-old woman past medical history seen for Lewy body dementia presenting from home with what sounds like sudden onset rigors the evening prior associated with increased generalized weakness today, with no report of trauma.  On my exam, she is able to follow simple commands but I am unable to do a full neuro assessment, CT imaging of her head along with CTA was obtained, this is negative.  She does have a history of UTIs, UA here is consistent with this.  She does not have a fever or leukocytosis however procalcitonin is significantly elevated.  Urine and blood cultures were sent, she was given IV ceftriaxone.  Given her increased weakness, living at home relying on her elderly  for care and concerning inflammatory markers, will plan for inpatient admission with IV antibiotics.  Case discussed with hospitalist as above.    Diagnosis:    ICD-10-CM    1. Acute cystitis without hematuria   N30.00       2. Generalized muscle weakness  M62.81       3. Severe Lewy body dementia, unspecified whether behavioral, psychotic, or mood disturbance or anxiety (H)  G31.83     F02.C0         Parker Silva MD  [employer]  9:06 PM 05/14/23        Scribe Disclosure:  I, Raúl Esteban, am serving as a scribe at 6:05 PM on 5/14/2023 to document services personally performed by Parker Silva MD based on my observations and the provider's statements to me.        Parker Silva MD  05/14/23 2101

## 2023-05-14 NOTE — ED TRIAGE NOTES
Patient presents via EMS. Per report, patient lives in independent living with . Hx of dementia and parkinson's. Patient is baseline AOx4 with some periods of confusion and independently ambulates. This morning was found to be disorientated to time, situation and place and unable to ambulate independently around 8:30 this morning. Medics unable to assess BEFAST in route. Patient only able to state name upon arrival      Triage Assessment     Row Name 05/14/23 6854       Triage Assessment (Adult)    Airway WDL WDL       Respiratory WDL    Respiratory WDL WDL       Skin Circulation/Temperature WDL    Skin Circulation/Temperature WDL WDL       Cardiac WDL    Cardiac WDL WDL       Peripheral/Neurovascular WDL    Peripheral Neurovascular WDL WDL       Cognitive/Neuro/Behavioral WDL    Cognitive/Neuro/Behavioral WDL X    Level of Consciousness confused       [place] Coma Scale    Best Eye Response 3-->(E3) to speech    Best Motor Response 6-->(M6) obeys commands    Best Verbal Response 4-->(V4) confused    [place] Coma Scale Score 13

## 2023-05-15 LAB
ACINETOBACTER SPECIES: NOT DETECTED
ANION GAP SERPL CALCULATED.3IONS-SCNC: 11 MMOL/L (ref 7–15)
BACTERIA UR CULT: ABNORMAL
BASOPHILS # BLD AUTO: 0 10E3/UL (ref 0–0.2)
BASOPHILS NFR BLD AUTO: 0 %
BUN SERPL-MCNC: 22.4 MG/DL (ref 8–23)
CALCIUM SERPL-MCNC: 8.9 MG/DL (ref 8.8–10.2)
CHLORIDE SERPL-SCNC: 103 MMOL/L (ref 98–107)
CITROBACTER SPECIES: NOT DETECTED
CREAT SERPL-MCNC: 1.45 MG/DL (ref 0.51–0.95)
CTX-M: NOT DETECTED
DEPRECATED HCO3 PLAS-SCNC: 25 MMOL/L (ref 22–29)
ENTEROBACTER SPECIES: NOT DETECTED
EOSINOPHIL # BLD AUTO: 0 10E3/UL (ref 0–0.7)
EOSINOPHIL NFR BLD AUTO: 0 %
ERYTHROCYTE [DISTWIDTH] IN BLOOD BY AUTOMATED COUNT: 13.3 % (ref 10–15)
ESCHERICHIA COLI: DETECTED
GFR SERPL CREATININE-BSD FRML MDRD: 37 ML/MIN/1.73M2
GLUCOSE SERPL-MCNC: 115 MG/DL (ref 70–99)
HCT VFR BLD AUTO: 31.2 % (ref 35–47)
HGB BLD-MCNC: 10.3 G/DL (ref 11.7–15.7)
IMM GRANULOCYTES # BLD: 0.1 10E3/UL
IMM GRANULOCYTES NFR BLD: 1 %
IMP: NOT DETECTED
KLEBSIELLA OXYTOCA: NOT DETECTED
KLEBSIELLA PNEUMONIAE: NOT DETECTED
KPC: NOT DETECTED
LACTATE SERPL-SCNC: 0.9 MMOL/L (ref 0.7–2)
LYMPHOCYTES # BLD AUTO: 0.3 10E3/UL (ref 0.8–5.3)
LYMPHOCYTES NFR BLD AUTO: 3 %
MCH RBC QN AUTO: 32.1 PG (ref 26.5–33)
MCHC RBC AUTO-ENTMCNC: 33 G/DL (ref 31.5–36.5)
MCV RBC AUTO: 97 FL (ref 78–100)
MONOCYTES # BLD AUTO: 0.9 10E3/UL (ref 0–1.3)
MONOCYTES NFR BLD AUTO: 10 %
NDM: NOT DETECTED
NEUTROPHILS # BLD AUTO: 7.7 10E3/UL (ref 1.6–8.3)
NEUTROPHILS NFR BLD AUTO: 86 %
NRBC # BLD AUTO: 0 10E3/UL
NRBC BLD AUTO-RTO: 0 /100
OXA (DETECTED/NOT DETECTED): NOT DETECTED
PLATELET # BLD AUTO: 133 10E3/UL (ref 150–450)
POTASSIUM SERPL-SCNC: 3.6 MMOL/L (ref 3.4–5.3)
PROTEUS SPECIES: NOT DETECTED
PSEUDOMONAS AERUGINOSA: NOT DETECTED
RBC # BLD AUTO: 3.21 10E6/UL (ref 3.8–5.2)
SODIUM SERPL-SCNC: 139 MMOL/L (ref 136–145)
VIM: NOT DETECTED
WBC # BLD AUTO: 9 10E3/UL (ref 4–11)

## 2023-05-15 PROCEDURE — 258N000003 HC RX IP 258 OP 636: Performed by: INTERNAL MEDICINE

## 2023-05-15 PROCEDURE — 250N000013 HC RX MED GY IP 250 OP 250 PS 637: Performed by: INTERNAL MEDICINE

## 2023-05-15 PROCEDURE — 250N000011 HC RX IP 250 OP 636: Performed by: INTERNAL MEDICINE

## 2023-05-15 PROCEDURE — 120N000001 HC R&B MED SURG/OB

## 2023-05-15 PROCEDURE — 85025 COMPLETE CBC W/AUTO DIFF WBC: CPT | Performed by: INTERNAL MEDICINE

## 2023-05-15 PROCEDURE — 36415 COLL VENOUS BLD VENIPUNCTURE: CPT | Performed by: HOSPITALIST

## 2023-05-15 PROCEDURE — 80048 BASIC METABOLIC PNL TOTAL CA: CPT | Performed by: INTERNAL MEDICINE

## 2023-05-15 PROCEDURE — 99232 SBSQ HOSP IP/OBS MODERATE 35: CPT | Performed by: HOSPITALIST

## 2023-05-15 PROCEDURE — 83605 ASSAY OF LACTIC ACID: CPT | Performed by: HOSPITALIST

## 2023-05-15 PROCEDURE — G0463 HOSPITAL OUTPT CLINIC VISIT: HCPCS

## 2023-05-15 PROCEDURE — 36415 COLL VENOUS BLD VENIPUNCTURE: CPT | Performed by: INTERNAL MEDICINE

## 2023-05-15 RX ORDER — SODIUM CHLORIDE 9 MG/ML
INJECTION, SOLUTION INTRAVENOUS CONTINUOUS
Status: DISCONTINUED | OUTPATIENT
Start: 2023-05-15 | End: 2023-05-18 | Stop reason: HOSPADM

## 2023-05-15 RX ADMIN — CARBIDOPA AND LEVODOPA 1 TABLET: 25; 100 TABLET ORAL at 17:58

## 2023-05-15 RX ADMIN — CARBIDOPA AND LEVODOPA 1 TABLET: 25; 100 TABLET ORAL at 12:08

## 2023-05-15 RX ADMIN — CEFTRIAXONE SODIUM 1 G: 1 INJECTION, POWDER, FOR SOLUTION INTRAMUSCULAR; INTRAVENOUS at 20:44

## 2023-05-15 RX ADMIN — CARBIDOPA AND LEVODOPA 1 TABLET: 25; 100 TABLET ORAL at 09:13

## 2023-05-15 RX ADMIN — SODIUM CHLORIDE: 9 INJECTION, SOLUTION INTRAVENOUS at 05:38

## 2023-05-15 RX ADMIN — SODIUM CHLORIDE: 9 INJECTION, SOLUTION INTRAVENOUS at 18:40

## 2023-05-15 RX ADMIN — RIVASTIGMINE TARTRATE 3 MG: 1.5 CAPSULE ORAL at 17:58

## 2023-05-15 RX ADMIN — RIVASTIGMINE TARTRATE 3 MG: 1.5 CAPSULE ORAL at 09:13

## 2023-05-15 RX ADMIN — ACETAMINOPHEN 650 MG: 325 TABLET ORAL at 18:40

## 2023-05-15 RX ADMIN — LEVOTHYROXINE SODIUM 75 MCG: 75 TABLET ORAL at 09:13

## 2023-05-15 ASSESSMENT — ACTIVITIES OF DAILY LIVING (ADL)
TOILETING_ISSUES: NO
ADLS_ACUITY_SCORE: 36
ADLS_ACUITY_SCORE: 36
DEPENDENT_IADLS:: CLEANING;COOKING;LAUNDRY;SHOPPING;MEAL PREPARATION;MEDICATION MANAGEMENT;MONEY MANAGEMENT;TRANSPORTATION
CHANGE_IN_FUNCTIONAL_STATUS_SINCE_ONSET_OF_CURRENT_ILLNESS/INJURY: YES
DOING_ERRANDS_INDEPENDENTLY_DIFFICULTY: YES
ADLS_ACUITY_SCORE: 36
DIFFICULTY_EATING/SWALLOWING: NO
FALL_HISTORY_WITHIN_LAST_SIX_MONTHS: NO
WEAR_GLASSES_OR_BLIND: YES
ADLS_ACUITY_SCORE: 26
EQUIPMENT_CURRENTLY_USED_AT_HOME: WALKER, STANDARD
ADLS_ACUITY_SCORE: 30
ADLS_ACUITY_SCORE: 26
ADLS_ACUITY_SCORE: 26
CONCENTRATING,_REMEMBERING_OR_MAKING_DECISIONS_DIFFICULTY: YES
ADLS_ACUITY_SCORE: 36
ADLS_ACUITY_SCORE: 35
ADLS_ACUITY_SCORE: 31
VISION_MANAGEMENT: GLASSES
ADLS_ACUITY_SCORE: 23
ADLS_ACUITY_SCORE: 31
WALKING_OR_CLIMBING_STAIRS_DIFFICULTY: NO
DRESSING/BATHING_DIFFICULTY: NO

## 2023-05-15 NOTE — CONSULTS
Care Management Initial Consult    General Information  Assessment completed with: Patient, Spouse or significant other, Spouse Farhat  Type of CM/SW Visit: Initial Assessment    Primary Care Provider verified and updated as needed: Yes   Readmission within the last 30 days: no previous admission in last 30 days      Reason for Consult: discharge planning  Advance Care Planning: Advance Care Planning Reviewed: no concerns identified  They have documents to fill out       Communication Assessment  Patient's communication style: spoken language (English or Bilingual)    Hearing Difficulty or Deaf: no   Wear Glasses or Blind: yes    Cognitive  Cognitive/Neuro/Behavioral: .WDL except, orientation, mood/behavior  Level of Consciousness: confused  Arousal Level: opens eyes spontaneously  Orientation: disoriented to, place, time, situation  Mood/Behavior: restless, anxious  Best Language: 0 - No aphasia  Speech: rambling, illogical    Living Environment:   People in home: spouse     Current living Arrangements: independent living facility      Able to return to prior arrangements: yes  Living Arrangement Comments: [facility] and eat dinner in their dining room and occassional breakfast or lunch there also    Family/Social Support:  Care provided by: spouse/significant other  Provides care for: no one, unable/limited ability to care for self  Marital Status:     Farhat       Description of Support System: Involved    Support Assessment: Adequate family and caregiver support    Current Resources:   Patient receiving home care services: No     Community Resources: Other (see comment) (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  No    Lifestyle & Psychosocial Needs:  Social Determinants of Health     Tobacco Use: Not on file (7/25/2018)   Alcohol Use: Not on file   Financial Resource Strain: Not on file   Food Insecurity: Not on file   Transportation Needs: Not on file   Physical Activity: Not on file   Stress: Not on file   Social Connections: Not on file   Intimate Partner Violence: Not on file   Depression: Not on file   Housing Stability: Not on file       Functional Status:  Prior to admission patient needed assistance:   Dependent ADLs:: Ambulation-walker, Bathing, Dressing, Grooming, Toileting  Dependent IADLs:: Cleaning, Cooking, Laundry, Shopping, Meal Preparation, Medication Management, Money Management, Transportation       Mental Health Status:  Mental Health Status: No Current Concerns       Chemical Dependency Status:  Chemical Dependency Status: No Current Concerns             Values/Beliefs:  Spiritual, Cultural Beliefs, Ep[employer] Practices, Values that affect care:   NA              Additional Information:  Care Coordinator met with patient and her Spouse Farhat and explained role of care management in discharge planning.  Patient has a history of Parkinson's w/Lewy body dementia; hypothyroidism; and chronic kidney disease.  She resides with her spouse in an independent apartment at [facility].  They sold their home last August and made the move to [facility].   Patient currently does not receive any services.  They often eat dinner in the dining room and occasionally will eat breakfast or lunch there also.   They privately hire a caregiver that comes in three days per week for four hours to allow Farhat time to do what he needs/wants to do.  In the apartment, patient walks independently.  Farhat noted he stays close in case she needs help and will help her in the bathroom where she tends to have  more difficulty.  Outside of their apartment, patient uses a four WW.  Farhat noted she would probably need to get a regular walker to use within their apartment.  Discussed current activity level.  PT willal is pending.  Farhat noted patient had done physical therapy at [facility] [place] in the past.  Farhat noted his brother in law is a retired physician and had told him to make sure we are monitoring her creatinine.  Reassurance was given that we are monitoring this and recent values were shared with him.  It is their goal that patient transitions home at discharge.    Care Management will continue to follow and be available to help with any discharge needs patient may have.    Marva Coppola, RN   Inpatient Care Management  [phone]

## 2023-05-15 NOTE — ED NOTES
"Patient feels warm to touch, oral temp 102.5, removed blankets. Patient does not open eyes when asked, refuses to take drink if water stating \"no\". Will administer rectal tylenol    "

## 2023-05-15 NOTE — ED NOTES
"[facility]  ED Nurse Handoff Report    ED Chief complaint: Altered Mental Status      ED Diagnosis:   Final diagnoses:   Acute cystitis without hematuria   Generalized muscle weakness   Severe Lewy body dementia, unspecified whether behavioral, psychotic, or mood disturbance or anxiety (H)       Code Status: hospitalist to address    Allergies:   Allergies   Allergen Reactions     Sulfa Antibiotics      Sulfonamide antibiotics       Patient Story: Per Dr. Silva's note,   \"Chief Complaint:  Altered Mental Status  The history is provided by the spouse and the EMS personnel. The history is limited by the condition of the patient.   Tracy Yee is a 78 year old female with a history of dementia and Parkinson's disease who presents with altered mental status. EMS reports that the patient lives independently with her . Her  reports that the patient was baseline yesterday, however this morning around 0830, she was unable to ambulate without assistance and was disoriented to everything but herself.  states that although the patient's mentation was baseline, she did complain of nausea and feeling ill yesterday afternoon. EMS notes that the patient has history of dementia and Parkinson's, however she is not normally confused.  adds that the patient has history of UTIs.\"     Focused Assessment:  Patient is only oriented to self, very restless and becomes slightly anxious with interventions, minimally redirectable. IV x 2 in place, has purewick.     Treatments and/or interventions provided: Rocephin IV, NS bolus  CTA Head Neck with Contrast   Final Result   IMPRESSION:    HEAD CT:   1.  No CT evidence for acute intracranial process.   2.  Brain atrophy and presumed chronic microvascular ischemic changes as above.      HEAD CTA:    1.  No significant stenosis, aneurysm, or high flow vascular malformation identified.      NECK CTA:   1.  No hemodynamically significant stenosis in " the neck vessels.    2.  No evidence for dissection.      Head CT w/o contrast   Final Result   IMPRESSION:    HEAD CT:   1.  No CT evidence for acute intracranial process.   2.  Brain atrophy and presumed chronic microvascular ischemic changes as above.      HEAD CTA:    1.  No significant stenosis, aneurysm, or high flow vascular malformation identified.      NECK CTA:   1.  No hemodynamically significant stenosis in the neck vessels.    2.  No evidence for dissection.        Labs Ordered and Resulted from Time of ED Arrival to Time of ED Departure   COMPREHENSIVE METABOLIC PANEL - Abnormal       Result Value    Sodium 136      Potassium 3.8      Chloride 99      Carbon Dioxide (CO2) 25      Anion Gap 12      Urea Nitrogen 19.6      Creatinine 1.13 (*)     Calcium 9.0      Glucose 128 (*)     Alkaline Phosphatase 77      AST 62 (*)     ALT 15      Protein Total 6.9      Albumin 4.1      Bilirubin Total 0.7      GFR Estimate 49 (*)    ROUTINE UA WITH MICROSCOPIC - Abnormal    Color Urine Straw      Appearance Urine Slightly Cloudy (*)     Glucose Urine Negative      Bilirubin Urine Negative      Ketones Urine 10 (*)     Specific Gravity Urine 1.014      Blood Urine Large (*)     pH Urine 6.0      Protein Albumin Urine 70 (*)     Urobilinogen Urine Normal      Nitrite Urine Negative      Leukocyte Esterase Urine Moderate (*)     Bacteria Urine Few (*)     Mucus Urine Present (*)     RBC Urine 6 (*)     WBC Urine 111 (*)    PROCALCITONIN - Abnormal    Procalcitonin 8.42 (*)    CBC WITH PLATELETS AND DIFFERENTIAL - Abnormal    WBC Count 7.2      RBC Count 3.56 (*)     Hemoglobin 11.4 (*)     Hematocrit 34.3 (*)     MCV 96      MCH 32.0      MCHC 33.2      RDW 13.1      Platelet Count 154      % Neutrophils 88      % Lymphocytes 3      % Monocytes 8      % Eosinophils 0      % Basophils 0      % Immature Granulocytes 1      NRBCs per 100 WBC 0      Absolute Neutrophils 6.4      Absolute Lymphocytes 0.2 (*)     Absolute  Monocytes 0.6      Absolute Eosinophils 0.0      Absolute Basophils 0.0      Absolute Immature Granulocytes 0.0      Absolute NRBCs 0.0     ISTAT GASES LACTATE VENOUS POCT - Abnormal    Lactic Acid POCT 1.3      Bicarbonate Venous POCT 27      O2 Sat, Venous POCT 31 (*)     pCO2V Venous POCT 43      pH Venous POCT 7.40      pO2 Venous POCT 20 (*)    BLOOD CULTURE   BLOOD CULTURE   URINE CULTURE     Patient's response to treatments and/or interventions: remains very confused, restless    To be done/followed up on inpatient unit: antibiotics, labs, monitor safety    Does this patient have any cognitive concerns?: Baseline dementia, Disoriented to time, Disoriented to place and Disoriented to situation, Hx. Parkinsons    Activity level - Baseline/Home:  Walker  Activity Level - Current:   Total Care    Patient's Preferred language: English   Needed?: No    Isolation: None  Infection: Not Applicable  Patient tested for COVID 19 prior to admission: NO  Bariatric?: No    Vital Signs:   Vitals:    05/14/23 1747 05/14/23 1936 05/14/23 1937 05/14/23 2006   BP: (!) 138/97 (!) 163/94  (!) 157/95   Pulse: 89      Resp: 18   18   Temp: 98.5  F (36.9  C)  99.4  F (37.4  C)    TempSrc: Temporal  Oral    SpO2: 96% (!) 87%  97%       Cardiac Rhythm:     Was the PSS-3 completed:   No -too confused  What interventions are required if any?               Family Comments:  at bedside, aware of admission  For the majority of the shift this patient's behavior was Green.   Behavioral interventions performed were redirection with confusion.    ED NURSE PHONE NUMBER: [phone]

## 2023-05-15 NOTE — PROGRESS NOTES
RECEIVING UNIT ED HANDOFF REVIEW    ED Nurse Handoff Report was reviewed by: Darlene Kasper RN on May 15, 2023 at 12:05 AM

## 2023-05-15 NOTE — PROVIDER NOTIFICATION
MD Notification    Notified Person: MD    Notified Person Name: Gi    Notification Date/Time: 5/15/23 1032     Notification Interaction: SharedReviews messaging    Purpose of Notification: Blood culture positive for E coli    Orders Received:    Comments:

## 2023-05-15 NOTE — PLAN OF CARE
DATE & TIME: 5/15/23 5337-2336   Cognitive Concerns/ Orientation : Alert to self   BEHAVIOR & AGGRESSION TOOL COLOR: Green  CIWA SCORE: NA   ABNL VS/O2: VSS RA  MOBILITY: Ax2, pivot, shuffling gait. PT consulted  PAIN MANAGMENT: no signs of pain  DIET: Regular  BOWEL/BLADDER: Incontinent. Bladder scan of 517 at 1530 straight cathed  ABNL LAB/BG: Cr 1.45, AST 62, Procal 8.42. Lactic fired- 0.9.  DRAIN/DEVICES: R PIV infusing NS @ 75mL/hr  TELEMETRY RHYTHM: NA  SKIN: scattered bruising, blanchable redness to R hip and coccyx. Sacral mepilex in place. Skin breakdown under L breast- MD aware, WOC consult placed.  TESTS/PROCEDURES: none  D/C DAY/GOALS/PLACE: pending progress  OTHER IMPORTANT INFO: Hx of lewy body dementia. Per family, pt is baseline confused.  states it is better today.

## 2023-05-15 NOTE — PROVIDER NOTIFICATION
MD Notification    Notified Person: MD    Notified Person Name:  Kapadia    Notification Date/Time: 0405 5/15/23    Notification Interaction: Amcom    Purpose of Notification: Pt was admitted this chanel. Not eating or drinking and having minimal output. BP 96/59 and continuing to soften since yesterday afternoon. Do you think she could benefit from IV fluids?    Orders Received:    Comments:

## 2023-05-15 NOTE — H&P
INTERNAL MEDICINE HISTORY AND PHYSICAL  [facility]      Tracy Yee [MR#: [id number]  : 1945  78 year old female]  Date of Admission:  2023  Primary Care Provider:  Nilam Dunn      Chief Complaint:   AMS, weakness.    History of Present Illness:   Ms. Tracy Yee is a 77 yo female with history of Lewy body dementia; hypothyroidism; and chronic kidney disease; who presents with altered mental status.    Patient lives with her  independently.  However yesterday,  noted that the patient  weaker and unable to walk without assistance;   Patient was noted to be confused and in a mental state that was not normal for her.  Overall given her issues patient was brought by EMS today to [facility] for further evaluation.    On initial evaluation, patient was afebrile, hypertensive.  ECG showed sinus rhythm, signs of LVH, nonspecific T wave changes, no acute ischemic changes.  Labs notable for BMP with creatinine 1.13; CBC with normal white count, hemoglobin 1.4 and platelets 154; LFTs with AST 62 otherwise normal; lactic acid 1.3; procalcitonin 8.42; urinalysis showed 111 white blood cells, moderate leukocyte esterase, negative nitrites, large blood, few bacteria.  Head CT and head and neck CTA were negative for acute findings.  Patient was given a dose of ceftriaxone.    Patient presently is mildly agitated and really not answering questions for me.   and daughter at bedside using that her mental status right now is abnormal.  Patient was talking incoherently to no one in particular and this was abnormal for the patient.  Daughter said that patient typically is confused and delusional with clear urine tract infections but presently this is much worse.      Past Medical History:   1. Lewy Body dementia.  2. Hypothyroidism.  3. Osteopenia.  4. CKD. Cr mostly mid 1 range over past few years.  5. H/o breast cancer s/p lumpectomy . Was on  tamoxifen for 3 years, then aromasin.  6. H/o orthostatic hypotension.  7. H/o colon polyps.  8. H/o UTI's.  9. H/o cystocele, rectocele.  10. H/o uterine prolapse.    From Care Everywhere:  Problem Noted Date Diagnosed Date   History of recurrent UTIs 10/17/2022     Overview:     Formatting of this note might be different from the original.  Saw urology 9/22   Protein-calorie malnutrition 04/22/2022     Lewy body Parkinson's disease 03/18/2022     Overview:     Formatting of this note might be different from the original.  Following with Dr. Bunch at [facility]   B12 deficiency 12/20/2021     CKD (chronic kidney disease) stage 4, GFR 15-29 ml/min 11/12/2021     Orthostatic hypotension 08/27/2021     Overview:     Formatting of this note might be different from the original.  Has not tolerated midodrine or florinef   Hypothyroidism 06/30/2017     Osteopenia 09/29/2009     Prediabetes 09/29/2009     Personal history of colonic polyps 01/09/2009     Overview:     Formatting of this note might be different from the original.  Father history of colon cancer     Routine general medical examination at a health care facility 07/17/2007     Overview:     Formatting of this note might be different from the original.  PE-4/22  Pap: prev nl  Mammogram: 10/19  DEXA: 2013  Colon: 2018, 2 small adenomatous polyps   hx left breast cancer 2002, s/p lumpectomy XRT 09/01/2002     Overview:     Formatting of this note might be different from the original.  Prev Dr. Wing  Tamoxifen 3 yrs then aromasin         Medical History Date Comments   Hypothyroidism Acquired Unspec       Hypertension       Breast Cancer 9/1/2002 tAMOXIFEN 3 YEARS THEN AROMASIN Dr. Segundo Min Onc   Bladder infection   recurrent   Cystocele       Rectocele       Uterine prolapse       Osteopenia       Cataract   Will be having surgery 4/2017   HTN (hypertension)   White coat: at home 104-134,   Unspecified hypothyroidism          Past Medical History:    Diagnosis Date     Breast cancer (H)     breast lumpectomy     Hypertension      Osteopenia      Parkinson's disease (H)      Prediabetes      Thyroid disease     hypothyroidism     Above past medical history reviewed and edited and/or added to as necessary.    Past Surgical History:     Past Surgical History:   Procedure Laterality Date     BREAST SURGERY      lumpectomy     COLONOSCOPY       COLONOSCOPY N/A 7/25/2018    Procedure: COMBINED COLONOSCOPY, SINGLE OR MULTIPLE BIOPSY/POLYPECTOMY BY BIOPSY;  COLONOSCOPY ;  Surgeon: Brittnee Fuentes MD;  Location:  GI     GYN SURGERY      hysterectomy     PHACOEMULSIFICATION CLEAR CORNEA WITH STANDARD INTRAOCULAR LENS IMPLANT Right 4/26/2017    Procedure: PHACOEMULSIFICATION CLEAR CORNEA WITH STANDARD INTRAOCULAR LENS IMPLANT;  RIGHT EYE PHACOEMULSIFICATION CLEAR CORNEA WITH STANDARD INTRAOCULAR LENS IMPLANT ;  Surgeon: Judd Moscoso MD;  Location: [facility]     PHACOEMULSIFICATION CLEAR CORNEA WITH STANDARD INTRAOCULAR LENS IMPLANT Left 5/3/2017    Procedure: PHACOEMULSIFICATION CLEAR CORNEA WITH STANDARD INTRAOCULAR LENS IMPLANT;  LEFT EYE PHACOEMULSIFICATION CLEAR CORNEA WITH STANDARD INTRAOCULAR LENS IMPLANT ;  Surgeon: Judd Moscoso MD;  Location: [facility]        Allergies:     Allergies   Allergen Reactions     Sulfa Antibiotics      Sulfonamide antibiotics        Medications:     Prior to Admission Medications   Prescriptions Last Dose Informant Patient Reported? Taking?   Carbidopa-Levodopa (SINEMET PO)   Yes No   Sig: Take by mouth 3 times daily   Levothyroxine Sodium (SYNTHROID PO)   Yes No   Sig: Take 12.5 mcg by mouth daily      Facility-Administered Medications: None       Social History:   . Lives with  in an independent living facility. Does not smoke. Occasional wine.    Social History     Socioeconomic History     Marital status:      Spouse name: Not on file     Number of children: Not on file     Years of education: Not on  file     Highest education level: Not on file   Occupational History     Not on file   Tobacco Use     Smoking status: Former     Types: Cigarettes     Quit date: 1975     Years since quittin.3     Smokeless tobacco: Never   Vaping Use     Vaping status: Not on file   Substance and Sexual Activity     Alcohol use: Yes     Comment: 3-5/week wine     Drug use: Not on file     Sexual activity: Not on file   Other Topics Concern     Parent/sibling w/ CABG, MI or angioplasty before 65F 55M? Not Asked   Social History Narrative     Not on file     Social Determinants of Health     Financial Resource Strain: Not on file   Food Insecurity: Not on file   Transportation Needs: Not on file   Physical Activity: Not on file   Stress: Not on file   Social Connections: Not on file   Intimate Partner Violence: Not on file   Housing Stability: Not on file       Family History:   Reviewed.  Family History   Problem Relation Age of Onset     Cerebrovascular Disease Mother      Colon Cancer Father      Other Cancer Father        Review of Systems:   As noted in the HPI; otherwise unable to obtain from the patient given her encephalopathy.     Physical Exam:   VITALS:  Blood pressure (!) 157/95, pulse 89, temperature 99.4  F (37.4  C), temperature source Oral, resp. rate 18, SpO2 97 %.  General: Awake, fatigued, confused, incoherent speech, agitated.   Patient would not allow me to examine her.    HEENT:    Neck:    Heart/Chest:    Lungs:    Abdomen:    Extremities/Musculoskeletal:    Skin:    Neurologic:      Labs, Imaging & Other Data:     Results for orders placed or performed during the hospital encounter of 23   Head CT w/o contrast     Status: None    Narrative    EXAM: CTA HEAD NECK W CONTRAST, CT HEAD W/O CONTRAST  LOCATION: [facility]  DATE/TIME: 2023 7:07 PM CDT    INDICATION: increased confusion, weakness, trouble speaking (hx of dementia)  COMPARISON: 2022  CONTRAST: 75mL  Isovue 370  TECHNIQUE: Head and neck CT angiogram with IV contrast. Noncontrast head CT followed by axial helical CT images of the head and neck vessels obtained during the arterial phase of intravenous contrast administration. Axial 2D reconstructed images and   multiplanar 3D MIP reconstructed images of the head and neck vessels were performed by the technologist. Dose reduction techniques were used. All stenosis measurements made according to NASCET criteria unless otherwise specified.    FINDINGS:   NONCONTRAST HEAD CT:   INTRACRANIAL CONTENTS: No intracranial hemorrhage, extraaxial collection, or mass effect.  No CT evidence of acute infarct. Mild presumed chronic small vessel ischemic changes. Mild to moderate generalized volume loss. No hydrocephalus.     VISUALIZED ORBITS/SINUSES/MASTOIDS: Prior bilateral cataract surgery. Visualized portions of the orbits are otherwise unremarkable. Mild mucosal thickening of the maxillary sinuses. No middle ear or mastoid effusion.    BONES/SOFT TISSUES: No acute abnormality.    HEAD CTA:  ANTERIOR CIRCULATION: Trace atherosclerotic calcification in the carotid siphons. No stenosis/occlusion, aneurysm, or high flow vascular malformation. Standard [place] anatomy.    POSTERIOR CIRCULATION: No stenosis/occlusion, aneurysm, or high flow vascular malformation. Balanced vertebral arteries supply a normal basilar artery.     DURAL VENOUS SINUSES: Expected enhancement of the major dural venous sinuses.    NECK CTA:  RIGHT CAROTID: Trace atherosclerotic plaque at the carotid bifurcation. No measurable stenosis or dissection.    LEFT CAROTID: Atherosclerotic plaque results in less than 50% stenosis in the left ICA. No dissection.    VERTEBRAL ARTERIES: No focal stenosis or dissection. Balanced vertebral arteries.    AORTIC ARCH: Classic aortic arch anatomy with no significant stenosis at the origin of the great vessels.    NONVASCULAR STRUCTURES: Reversal of the cervical  spine lordosis with multilevel degenerative anterolisthesis from C2 through C5. Age-indeterminate mild compression deformity at T2. No high-grade spinal canal stenosis. Left greater than right neural   foraminal narrowing. Bilateral pleural effusions. Scarring of the lung apices.      Impression    IMPRESSION:   HEAD CT:  1.  No CT evidence for acute intracranial process.  2.  Brain atrophy and presumed chronic microvascular ischemic changes as above.    HEAD CTA:   1.  No significant stenosis, aneurysm, or high flow vascular malformation identified.    NECK CTA:  1.  No hemodynamically significant stenosis in the neck vessels.   2.  No evidence for dissection.   CTA Head Neck with Contrast     Status: None    Narrative    EXAM: CTA HEAD NECK W CONTRAST, CT HEAD W/O CONTRAST  LOCATION: [facility]  DATE/TIME: 5/14/2023 7:07 PM CDT    INDICATION: increased confusion, weakness, trouble speaking (hx of dementia)  COMPARISON: 04/12/2022  CONTRAST: 75mL Isovue 370  TECHNIQUE: Head and neck CT angiogram with IV contrast. Noncontrast head CT followed by axial helical CT images of the head and neck vessels obtained during the arterial phase of intravenous contrast administration. Axial 2D reconstructed images and   multiplanar 3D MIP reconstructed images of the head and neck vessels were performed by the technologist. Dose reduction techniques were used. All stenosis measurements made according to NASCET criteria unless otherwise specified.    FINDINGS:   NONCONTRAST HEAD CT:   INTRACRANIAL CONTENTS: No intracranial hemorrhage, extraaxial collection, or mass effect.  No CT evidence of acute infarct. Mild presumed chronic small vessel ischemic changes. Mild to moderate generalized volume loss. No hydrocephalus.     VISUALIZED ORBITS/SINUSES/MASTOIDS: Prior bilateral cataract surgery. Visualized portions of the orbits are otherwise unremarkable. Mild mucosal thickening of the maxillary sinuses. No  middle ear or mastoid effusion.    BONES/SOFT TISSUES: No acute abnormality.    HEAD CTA:  ANTERIOR CIRCULATION: Trace atherosclerotic calcification in the carotid siphons. No stenosis/occlusion, aneurysm, or high flow vascular malformation. Standard [place] of [place] anatomy.    POSTERIOR CIRCULATION: No stenosis/occlusion, aneurysm, or high flow vascular malformation. Balanced vertebral arteries supply a normal basilar artery.     DURAL VENOUS SINUSES: Expected enhancement of the major dural venous sinuses.    NECK CTA:  RIGHT CAROTID: Trace atherosclerotic plaque at the carotid bifurcation. No measurable stenosis or dissection.    LEFT CAROTID: Atherosclerotic plaque results in less than 50% stenosis in the left ICA. No dissection.    VERTEBRAL ARTERIES: No focal stenosis or dissection. Balanced vertebral arteries.    AORTIC ARCH: Classic aortic arch anatomy with no significant stenosis at the origin of the great vessels.    NONVASCULAR STRUCTURES: Reversal of the cervical spine lordosis with multilevel degenerative anterolisthesis from C2 through C5. Age-indeterminate mild compression deformity at T2. No high-grade spinal canal stenosis. Left greater than right neural   foraminal narrowing. Bilateral pleural effusions. Scarring of the lung apices.      Impression    IMPRESSION:   HEAD CT:  1.  No CT evidence for acute intracranial process.  2.  Brain atrophy and presumed chronic microvascular ischemic changes as above.    HEAD CTA:   1.  No significant stenosis, aneurysm, or high flow vascular malformation identified.    NECK CTA:  1.  No hemodynamically significant stenosis in the neck vessels.   2.  No evidence for dissection.   Comprehensive metabolic panel     Status: Abnormal   Result Value Ref Range    Sodium 136 136 - 145 mmol/L    Potassium 3.8 3.4 - 5.3 mmol/L    Chloride 99 98 - 107 mmol/L    Carbon Dioxide (CO2) 25 22 - 29 mmol/L    Anion Gap 12 7 - 15 mmol/L    Urea Nitrogen 19.6 8.0 - 23.0 mg/dL     Creatinine 1.13 (H) 0.51 - 0.95 mg/dL    Calcium 9.0 8.8 - 10.2 mg/dL    Glucose 128 (H) 70 - 99 mg/dL    Alkaline Phosphatase 77 35 - 104 U/L    AST 62 (H) 10 - 35 U/L    ALT 15 10 - 35 U/L    Protein Total 6.9 6.4 - 8.3 g/dL    Albumin 4.1 3.5 - 5.2 g/dL    Bilirubin Total 0.7 <=1.2 mg/dL    GFR Estimate 49 (L) >60 mL/min/1.73m2   UA with Microscopic     Status: Abnormal   Result Value Ref Range    Color Urine Straw Colorless, Straw, Light Yellow, Yellow    Appearance Urine Slightly Cloudy (A) Clear    Glucose Urine Negative Negative mg/dL    Bilirubin Urine Negative Negative    Ketones Urine 10 (A) Negative mg/dL    Specific Gravity Urine 1.014 1.003 - 1.035    Blood Urine Large (A) Negative    pH Urine 6.0 5.0 - 7.0    Protein Albumin Urine 70 (A) Negative mg/dL    Urobilinogen Urine Normal Normal, 2.0 mg/dL    Nitrite Urine Negative Negative    Leukocyte Esterase Urine Moderate (A) Negative    Bacteria Urine Few (A) None Seen /HPF    Mucus Urine Present (A) None Seen /LPF    RBC Urine 6 (H) <=2 /HPF    WBC Urine 111 (H) <=5 /HPF   Procalcitonin     Status: Abnormal   Result Value Ref Range    Procalcitonin 8.42 (HH) <0.05 ng/mL   CBC with platelets and differential     Status: Abnormal   Result Value Ref Range    WBC Count 7.2 4.0 - 11.0 10e3/uL    RBC Count 3.56 (L) 3.80 - 5.20 10e6/uL    Hemoglobin 11.4 (L) 11.7 - 15.7 g/dL    Hematocrit 34.3 (L) 35.0 - 47.0 %    MCV 96 78 - 100 fL    MCH 32.0 26.5 - 33.0 pg    MCHC 33.2 31.5 - 36.5 g/dL    RDW 13.1 10.0 - 15.0 %    Platelet Count 154 150 - 450 10e3/uL    % Neutrophils 88 %    % Lymphocytes 3 %    % Monocytes 8 %    % Eosinophils 0 %    % Basophils 0 %    % Immature Granulocytes 1 %    NRBCs per 100 WBC 0 <1 /100    Absolute Neutrophils 6.4 1.6 - 8.3 10e3/uL    Absolute Lymphocytes 0.2 (L) 0.8 - 5.3 10e3/uL    Absolute Monocytes 0.6 0.0 - 1.3 10e3/uL    Absolute Eosinophils 0.0 0.0 - 0.7 10e3/uL    Absolute Basophils 0.0 0.0 - 0.2 10e3/uL    Absolute Immature  Granulocytes 0.0 <=0.4 10e3/uL    Absolute NRBCs 0.0 10e3/uL   iStat Gases (lactate) venous, POCT     Status: Abnormal   Result Value Ref Range    Lactic Acid POCT 1.3 <=2.0 mmol/L    Bicarbonate Venous POCT 27 21 - 28 mmol/L    O2 Sat, Venous POCT 31 (L) 94 - 100 %    pCO2V Venous POCT 43 40 - 50 mm Hg    pH Venous POCT 7.40 7.32 - 7.43    pO2 Venous POCT 20 (L) 25 - 47 mm Hg   [place] Draw     Status: None    Narrative    The following orders were created for panel order [place] Draw.  Procedure                               Abnormality         Status                     ---------                               -----------         ------                     Extra Blue Top Tube[[id number]]                              Final result               Extra Red Top Tube[[id number]]                               Final result                 Please view results for these tests on the individual orders.   Extra Blue Top Tube     Status: None   Result Value Ref Range    Hold Specimen JIC    Extra Red Top Tube     Status: None   Result Value Ref Range    Hold Specimen JIC    EKG 12 lead     Status: None   Result Value Ref Range    Systolic Blood Pressure  mmHg    Diastolic Blood Pressure  mmHg    Ventricular Rate 76 BPM    Atrial Rate 76 BPM    FL Interval 174 ms    QRS Duration 88 ms     ms    QTc 425 ms    P Axis 71 degrees    R AXIS -25 degrees    T Axis 71 degrees    Interpretation ECG       Sinus rhythm  Possible Left atrial enlargement  Left ventricular hypertrophy ( R in aVL , Otilio product )  Nonspecific T wave abnormality  Abnormal ECG  No previous ECGs available  Confirmed by GENERATED REPORT, COMPUTER ([id number]),  Krysta Adkins ([id number]) on 5/14/2023 7:50:45 PM     CBC with platelets + differential     Status: Abnormal    Narrative    The following orders were created for panel order CBC with platelets + differential.  Procedure                               Abnormality         Status                      ---------                               -----------         ------                     CBC with platelets and d...[[id number]]  Abnormal            Final result                 Please view results for these tests on the individual orders.         ASSESSMENT & PLAN:   Ms. Tracy Yee is a 79 yo female with history of Lewy body dementia; hypothyroidism; chronic disease; who presents with altered mental status and found with evidence of UTI.    On initial evaluation, patient was afebrile, hypertensive.  ECG showed sinus rhythm, signs of LVH, nonspecific T wave changes, no acute ischemic changes.  Labs notable for BMP with creatinine 1.13; CBC with normal white count, hemoglobin 1.4 and platelets 154; LFTs with AST 62 otherwise normal; lactic acid 1.3; procalcitonin 8.42; urinalysis showed 111 white blood cells, moderate leukocyte esterase, negative nitrites, large blood, few bacteria.  Head CT and head and neck CTA were negative for acute findings.  Patient was given a dose of ceftriaxone.    UTI.  * Initial presentation as above. BC, UC pending. Given ceftriaxone in ED.  - Continue ceftriaxone.  - Follow-up blood cultures and urine culture.    AMS, suspect infectious/septic encephalopathy.  Lewy body dementia.  * Initial presentation as above.  Patient was confused and speaking incoherently which is not normal for her.  - Continue to treat other issues as noted.  - Continue carbidopa-levodopa and rivastigmine.  - Re-orient as needed.  - Maintain normal day/night, sleep/wake cycles.  - Minimize sedating medications as able.    CKD.  * Patient noted chronic kidney disease and from review of her electronic medical record it appears that her creatinine had been mostly in the mid 1 range.  Today her creatinine is 1.13.  - Monitor BMP.  - Avoid nephrotoxic medications.    Hypothyroidism.  - Continue levothyroxine.    Prophylaxis.  - PCD's, ambulation.    CODE STATUS: DNR-DNI (discussed with pt's  and daughter,  currently in the process of formally completing DNR paperwork).      Jonah Cabral Jr., MD  [phone] (p)  Text Page  Vocera

## 2023-05-15 NOTE — PLAN OF CARE
Goal Outcome Evaluation:       Summary:  AMS, UTI  DATE & TIME: 5/14/23 9284-5904    Cognitive Concerns/ Orientation : Alert to self   BEHAVIOR & AGGRESSION TOOL COLOR: Green- slept all shift between cares  CIWA SCORE: NA   ABNL VS/O2: VSS RA. Soft 96/59  MOBILITY: Ax2, not OOB this shift  PAIN MANAGMENT: no signs of pain  DIET: Regular  BOWEL/BLADDER: Incontinent  ABNL LAB/BG: Cr 1.13, AST 62, Procal 8.42  DRAIN/DEVICES: R PIV infusing NS @ 75mL/hr  TELEMETRY RHYTHM: NA  SKIN: scattered bruising, blanchable redness to R hip and coccyx. Sacral mepilex in place. Skin breakdown under L breast- MD aware, WOC consult placed.  TESTS/PROCEDURES: none  D/C DAY/GOALS/PLACE: pending progress  OTHER IMPORTANT INFO: Hx of lewy body dementia. Per family, pt is baseline confused but is much more confused than normal.

## 2023-05-15 NOTE — PHARMACY-ADMISSION MEDICATION HISTORY
Pharmacist Admission Medication History    Admission medication history is complete. The information provided in this note is only as accurate as the sources available at the time of the update.    Medication reconciliation/reorder completed by provider prior to medication history? No    Information Source(s): Family member via in-person --> spoke with       Changes made to PTA medication list:    Added: Tylenol, Rivastigmine    Deleted: None    Changed: Updated sinemet & levothyroxine doses         Allergies reviewed with patient and updates made in EHR: yes -  did not know pt's reaction to sulfa    Medication History Completed By: Comfort Blair 5/14/2023 8:54 PM    Prior to Admission medications    Medication Sig Last Dose Taking? Auth Provider Long Term End Date   acetaminophen (TYLENOL) 500 MG tablet Take 500 mg by mouth every 4 hours as needed for mild pain Past Week Yes Unknown, Entered By History     carbidopa-levodopa (SINEMET)  MG tablet Take 1 tablet by mouth 5 times daily Doses are spaced 3 hours apart (No specific time schedule --> dependant on when pt wakes up in the morning). 5/13/2023 at PM Yes Unknown, Entered By History No    carbidopa-levodopa (SINEMET)  MG tablet Take 1 tablet by mouth 2 times daily as needed (Breakthrough Parkinsons symptoms) prn med Yes Unknown, Entered By History No    levothyroxine (SYNTHROID/LEVOTHROID) 75 MCG tablet Take 75 mcg by mouth daily 5/13/2023 at am Yes Unknown, Entered By History No    rivastigmine (EXELON) 3 MG capsule Take 3 mg by mouth 2 times daily (with meals) 5/13/2023 at PM Yes Unknown, Entered By History

## 2023-05-15 NOTE — PROVIDER NOTIFICATION
MD Notification    Notified Person: MD    Notified Person Name: Dr. Kapadia    Notification Date/Time: 5/15/23 7122    Notification Interaction: [employer]    Purpose of Notification:Pt has skin breakdown under L breast. Needs WOC consult and possibly miconazole powder?    Orders Received:    Comments:

## 2023-05-15 NOTE — PROVIDER NOTIFICATION
MD Notification    Notified Person: MD    Notified Person Name: Gi    Notification Date/Time: 5/15/23 0817     Notification Interaction: [employer] messaging    Purpose of Notification: positive blood cultures- gram negative bacilli from 5/14 at 1929    Orders Received:    Comments:

## 2023-05-15 NOTE — PROGRESS NOTES
[facility]    Medicine Progress Note - Hospitalist Service    Date of Admission:  5/14/2023    Assessment & Plan   Tracy Yee is a 79 yo female with history of Lewy body dementia; hypothyroidism; chronic disease; who presents with altered mental status and found with evidence of UTI.     On initial evaluation, patient was afebrile, hypertensive.  ECG showed sinus rhythm, signs of LVH, nonspecific T wave changes, no acute ischemic changes.  Labs notable for BMP with creatinine 1.13; CBC with normal white count, hemoglobin 1.4 and platelets 154; LFTs with AST 62 otherwise normal; lactic acid 1.3; procalcitonin 8.42; urinalysis showed 111 white blood cells, moderate leukocyte esterase, negative nitrites, large blood, few bacteria.  Head CT and head and neck CTA were negative for acute findings.  Patient was given a dose of ceftriaxone.     Sepsis due to UTI  E.coli bacteremia  * Initial presentation as above. Developed a fever up to 102.5 and tachycardia in the hospital.  - Admitted to inpatient.  - Urine culture pending.  - Blood cultures with 2/4 bottles positive for E.coli, sensitivities pending.  - Continue ceftriaxone while awaiting further culture results.  - PT consult.  - Tried to update family today, but unable to get in contact with her .     AMS, suspect infectious/septic encephalopathy  Lewy body dementia  * Initial presentation as above.  Patient was confused and speaking incoherently which is not normal for her.  - Continue to treat infection as noted.  - Continue PTA carbidopa-levodopa and rivastigmine.  - Re-orient as needed.  - Maintain normal day/night, sleep/wake cycles.  - Minimize sedating medications as able.     Acute kidney injury  CKD, stage 3a  * Patient noted chronic kidney disease and from review of her electronic medical record it appears that her creatinine had been mostly in the mid 1 range. Creatinine 1.13 in the ER on 5/14/23.  - Creatinine up to  1.45 on 5/15/23. Suspect due to sepsis.  - IV fluids.  - Recheck labs in AM.  - Avoid nephrotoxic medications.     Hypothyroidism  - Continue PTA levothyroxine.        Diet: Combination Diet Regular Diet Adult    DVT Prophylaxis: Pneumatic Compression Devices  Ibarra Catheter: Not present  Lines: None     Cardiac Monitoring: None  Code Status: No CPR- Do NOT Intubate      Clinically Significant Risk Factors Present on Admission                  # Hypertension: Noted on problem list   # Dementia: noted on problem list             Disposition Plan      Expected Discharge Date: 05/18/2023                  Joo Angelo MD  Hospitalist Service  [facility]  Securely message with [employer] (more info)  Text page via [facility] Paging/Directory   ______________________________________________________________________    Interval History   Tracy Yee was seen this morning. She feels OK. Wants to go home. Doesn't remember why she is in the hospital. Denies fevers, chest pain, shortness of breath, nausea, abdominal pain. Tried to update her  but just got voicemail. Left a brief update and a number for him to call back.    Physical Exam   Vital Signs: Temp: 98.5  F (36.9  C) Temp src: Oral BP: 136/81 Pulse: 83   Resp: 16 SpO2: 94 % O2 Device: None (Room air)    Weight: 100 lbs 1.42 oz    Constitutional: awake, alert, cooperative, no apparent distress, laying in the hospital bed  Respiratory: no increased work of breathing, clear to auscultation bilaterally, no crackles or wheezing  Cardiovascular: regular rate and rhythm, normal S1 and S2, no murmur noted  GI: normal bowel sounds, soft, non-distended, non-tender  Skin: warm, dry  Musculoskeletal: no lower extremity pitting edema present  Neurologic: awake, alert, hard of hearing, answered most questions appropriately but poor short term memory, moves all extremities    Medical Decision Making       40 MINUTES SPENT BY ME on the date of service  doing chart review, history, exam, documentation & further activities per the note.      Data     I have personally reviewed the following data over the past 24 hrs:    9.0  \   10.3 (L)   / 133 (L)     139 103 22.4 /  115 (H)   3.6 25 1.45 (H) \       ALT: 15 AST: 62 (H) AP: 77 TBILI: 0.7   ALB: 4.1 TOT PROTEIN: 6.9 LIPASE: N/A       Procal: 8.42 (HH) CRP: N/A Lactic Acid: 0.9         Imaging results reviewed over the past 24 hrs:   Recent Results (from the past 24 hour(s))   Head CT w/o contrast    Narrative    EXAM: CTA HEAD NECK W CONTRAST, CT HEAD W/O CONTRAST  LOCATION: [facility]  DATE/TIME: 5/14/2023 7:07 PM CDT    INDICATION: increased confusion, weakness, trouble speaking (hx of dementia)  COMPARISON: 04/12/2022  CONTRAST: 75mL Isovue 370  TECHNIQUE: Head and neck CT angiogram with IV contrast. Noncontrast head CT followed by axial helical CT images of the head and neck vessels obtained during the arterial phase of intravenous contrast administration. Axial 2D reconstructed images and   multiplanar 3D MIP reconstructed images of the head and neck vessels were performed by the technologist. Dose reduction techniques were used. All stenosis measurements made according to NASCET criteria unless otherwise specified.    FINDINGS:   NONCONTRAST HEAD CT:   INTRACRANIAL CONTENTS: No intracranial hemorrhage, extraaxial collection, or mass effect.  No CT evidence of acute infarct. Mild presumed chronic small vessel ischemic changes. Mild to moderate generalized volume loss. No hydrocephalus.     VISUALIZED ORBITS/SINUSES/MASTOIDS: Prior bilateral cataract surgery. Visualized portions of the orbits are otherwise unremarkable. Mild mucosal thickening of the maxillary sinuses. No middle ear or mastoid effusion.    BONES/SOFT TISSUES: No acute abnormality.    HEAD CTA:  ANTERIOR CIRCULATION: Trace atherosclerotic calcification in the carotid siphons. No stenosis/occlusion, aneurysm, or high  flow vascular malformation. Standard [place] anatomy.    POSTERIOR CIRCULATION: No stenosis/occlusion, aneurysm, or high flow vascular malformation. Balanced vertebral arteries supply a normal basilar artery.     DURAL VENOUS SINUSES: Expected enhancement of the major dural venous sinuses.    NECK CTA:  RIGHT CAROTID: Trace atherosclerotic plaque at the carotid bifurcation. No measurable stenosis or dissection.    LEFT CAROTID: Atherosclerotic plaque results in less than 50% stenosis in the left ICA. No dissection.    VERTEBRAL ARTERIES: No focal stenosis or dissection. Balanced vertebral arteries.    AORTIC ARCH: Classic aortic arch anatomy with no significant stenosis at the origin of the great vessels.    NONVASCULAR STRUCTURES: Reversal of the cervical spine lordosis with multilevel degenerative anterolisthesis from C2 through C5. Age-indeterminate mild compression deformity at T2. No high-grade spinal canal stenosis. Left greater than right neural   foraminal narrowing. Bilateral pleural effusions. Scarring of the lung apices.      Impression    IMPRESSION:   HEAD CT:  1.  No CT evidence for acute intracranial process.  2.  Brain atrophy and presumed chronic microvascular ischemic changes as above.    HEAD CTA:   1.  No significant stenosis, aneurysm, or high flow vascular malformation identified.    NECK CTA:  1.  No hemodynamically significant stenosis in the neck vessels.   2.  No evidence for dissection.   CTA Head Neck with Contrast    Narrative    EXAM: CTA HEAD NECK W CONTRAST, CT HEAD W/O CONTRAST  LOCATION: [facility]  DATE/TIME: 5/14/2023 7:07 PM CDT    INDICATION: increased confusion, weakness, trouble speaking (hx of dementia)  COMPARISON: 04/12/2022  CONTRAST: 75mL Isovue 370  TECHNIQUE: Head and neck CT angiogram with IV contrast. Noncontrast head CT followed by axial helical CT images of the head and neck vessels obtained during the arterial phase of intravenous  contrast administration. Axial 2D reconstructed images and   multiplanar 3D MIP reconstructed images of the head and neck vessels were performed by the technologist. Dose reduction techniques were used. All stenosis measurements made according to NASCET criteria unless otherwise specified.    FINDINGS:   NONCONTRAST HEAD CT:   INTRACRANIAL CONTENTS: No intracranial hemorrhage, extraaxial collection, or mass effect.  No CT evidence of acute infarct. Mild presumed chronic small vessel ischemic changes. Mild to moderate generalized volume loss. No hydrocephalus.     VISUALIZED ORBITS/SINUSES/MASTOIDS: Prior bilateral cataract surgery. Visualized portions of the orbits are otherwise unremarkable. Mild mucosal thickening of the maxillary sinuses. No middle ear or mastoid effusion.    BONES/SOFT TISSUES: No acute abnormality.    HEAD CTA:  ANTERIOR CIRCULATION: Trace atherosclerotic calcification in the carotid siphons. No stenosis/occlusion, aneurysm, or high flow vascular malformation. Standard [place] anatomy.    POSTERIOR CIRCULATION: No stenosis/occlusion, aneurysm, or high flow vascular malformation. Balanced vertebral arteries supply a normal basilar artery.     DURAL VENOUS SINUSES: Expected enhancement of the major dural venous sinuses.    NECK CTA:  RIGHT CAROTID: Trace atherosclerotic plaque at the carotid bifurcation. No measurable stenosis or dissection.    LEFT CAROTID: Atherosclerotic plaque results in less than 50% stenosis in the left ICA. No dissection.    VERTEBRAL ARTERIES: No focal stenosis or dissection. Balanced vertebral arteries.    AORTIC ARCH: Classic aortic arch anatomy with no significant stenosis at the origin of the great vessels.    NONVASCULAR STRUCTURES: Reversal of the cervical spine lordosis with multilevel degenerative anterolisthesis from C2 through C5. Age-indeterminate mild compression deformity at T2. No high-grade spinal canal stenosis. Left greater than right neural    foraminal narrowing. Bilateral pleural effusions. Scarring of the lung apices.      Impression    IMPRESSION:   HEAD CT:  1.  No CT evidence for acute intracranial process.  2.  Brain atrophy and presumed chronic microvascular ischemic changes as above.    HEAD CTA:   1.  No significant stenosis, aneurysm, or high flow vascular malformation identified.    NECK CTA:  1.  No hemodynamically significant stenosis in the neck vessels.   2.  No evidence for dissection.

## 2023-05-16 ENCOUNTER — APPOINTMENT (OUTPATIENT)
Dept: PHYSICAL THERAPY | Facility: CLINIC | Age: 78
DRG: 871 | End: 2023-05-16
Attending: HOSPITALIST
Payer: COMMERCIAL

## 2023-05-16 LAB
ANION GAP SERPL CALCULATED.3IONS-SCNC: 14 MMOL/L (ref 7–15)
BUN SERPL-MCNC: 21.2 MG/DL (ref 8–23)
CALCIUM SERPL-MCNC: 9 MG/DL (ref 8.8–10.2)
CHLORIDE SERPL-SCNC: 104 MMOL/L (ref 98–107)
CREAT SERPL-MCNC: 1.21 MG/DL (ref 0.51–0.95)
DEPRECATED HCO3 PLAS-SCNC: 20 MMOL/L (ref 22–29)
ERYTHROCYTE [DISTWIDTH] IN BLOOD BY AUTOMATED COUNT: 13.2 % (ref 10–15)
GFR SERPL CREATININE-BSD FRML MDRD: 45 ML/MIN/1.73M2
GLUCOSE SERPL-MCNC: 141 MG/DL (ref 70–99)
HCT VFR BLD AUTO: 35.3 % (ref 35–47)
HGB BLD-MCNC: 11.8 G/DL (ref 11.7–15.7)
MCH RBC QN AUTO: 32 PG (ref 26.5–33)
MCHC RBC AUTO-ENTMCNC: 33.4 G/DL (ref 31.5–36.5)
MCV RBC AUTO: 96 FL (ref 78–100)
PLATELET # BLD AUTO: 149 10E3/UL (ref 150–450)
POTASSIUM SERPL-SCNC: 3.4 MMOL/L (ref 3.4–5.3)
PROCALCITONIN SERPL IA-MCNC: 43.26 NG/ML
RBC # BLD AUTO: 3.69 10E6/UL (ref 3.8–5.2)
SODIUM SERPL-SCNC: 138 MMOL/L (ref 136–145)
WBC # BLD AUTO: 6.3 10E3/UL (ref 4–11)

## 2023-05-16 PROCEDURE — 258N000003 HC RX IP 258 OP 636: Performed by: INTERNAL MEDICINE

## 2023-05-16 PROCEDURE — 97116 GAIT TRAINING THERAPY: CPT | Mod: GP | Performed by: PHYSICAL THERAPIST

## 2023-05-16 PROCEDURE — 82310 ASSAY OF CALCIUM: CPT | Performed by: HOSPITALIST

## 2023-05-16 PROCEDURE — 97161 PT EVAL LOW COMPLEX 20 MIN: CPT | Mod: GP | Performed by: PHYSICAL THERAPIST

## 2023-05-16 PROCEDURE — 84145 PROCALCITONIN (PCT): CPT | Performed by: HOSPITALIST

## 2023-05-16 PROCEDURE — 99232 SBSQ HOSP IP/OBS MODERATE 35: CPT | Performed by: HOSPITALIST

## 2023-05-16 PROCEDURE — 97530 THERAPEUTIC ACTIVITIES: CPT | Mod: GP | Performed by: PHYSICAL THERAPIST

## 2023-05-16 PROCEDURE — 250N000013 HC RX MED GY IP 250 OP 250 PS 637: Performed by: INTERNAL MEDICINE

## 2023-05-16 PROCEDURE — 250N000011 HC RX IP 250 OP 636: Performed by: INTERNAL MEDICINE

## 2023-05-16 PROCEDURE — 85027 COMPLETE CBC AUTOMATED: CPT | Performed by: HOSPITALIST

## 2023-05-16 PROCEDURE — 36415 COLL VENOUS BLD VENIPUNCTURE: CPT | Performed by: HOSPITALIST

## 2023-05-16 PROCEDURE — 120N000001 HC R&B MED SURG/OB

## 2023-05-16 RX ADMIN — CARBIDOPA AND LEVODOPA 1 TABLET: 25; 100 TABLET ORAL at 18:08

## 2023-05-16 RX ADMIN — RIVASTIGMINE TARTRATE 3 MG: 1.5 CAPSULE ORAL at 18:08

## 2023-05-16 RX ADMIN — CARBIDOPA AND LEVODOPA 1 TABLET: 25; 100 TABLET ORAL at 11:53

## 2023-05-16 RX ADMIN — SODIUM CHLORIDE: 9 INJECTION, SOLUTION INTRAVENOUS at 06:19

## 2023-05-16 RX ADMIN — RIVASTIGMINE TARTRATE 3 MG: 1.5 CAPSULE ORAL at 08:57

## 2023-05-16 RX ADMIN — SODIUM CHLORIDE: 9 INJECTION, SOLUTION INTRAVENOUS at 18:55

## 2023-05-16 RX ADMIN — CARBIDOPA AND LEVODOPA 1 TABLET: 25; 100 TABLET ORAL at 15:56

## 2023-05-16 RX ADMIN — CARBIDOPA AND LEVODOPA 1 TABLET: 25; 100 TABLET ORAL at 08:57

## 2023-05-16 RX ADMIN — Medication 1 MG: at 20:37

## 2023-05-16 RX ADMIN — CARBIDOPA AND LEVODOPA 1 TABLET: 25; 100 TABLET ORAL at 20:37

## 2023-05-16 RX ADMIN — CEFTRIAXONE SODIUM 1 G: 1 INJECTION, POWDER, FOR SOLUTION INTRAMUSCULAR; INTRAVENOUS at 20:42

## 2023-05-16 RX ADMIN — LEVOTHYROXINE SODIUM 75 MCG: 75 TABLET ORAL at 08:57

## 2023-05-16 ASSESSMENT — ACTIVITIES OF DAILY LIVING (ADL)
ADLS_ACUITY_SCORE: 43
ADLS_ACUITY_SCORE: 38
ADLS_ACUITY_SCORE: 38
ADLS_ACUITY_SCORE: 39
ADLS_ACUITY_SCORE: 38
ADLS_ACUITY_SCORE: 39
ADLS_ACUITY_SCORE: 43
ADLS_ACUITY_SCORE: 38
ADLS_ACUITY_SCORE: 38
ADLS_ACUITY_SCORE: 43
ADLS_ACUITY_SCORE: 38
ADLS_ACUITY_SCORE: 43

## 2023-05-16 NOTE — PLAN OF CARE
Goal Outcome Evaluation:       Summary:  AMS, UTI  DATE & TIME: 5/16/2023 [id number]  Cognitive Concerns/ Orientation : Alert to self only   BEHAVIOR & AGGRESSION TOOL COLOR: Green, pt can be agitated with cares   CIWA SCORE: NA   ABNL VS/O2: VSS on room air   MOBILITY: Ax2, pivot, shuffling gait. PT following, up in recliner chair this am with 2 assist with gait belt/walker  PAIN MANAGMENT: no signs of pain, denies  DIET: Regular-poor appetite, pills crushed in applesauce, resistance at times, needs a lot of encouragement, spouse at bedside  BOWEL/BLADDER: Incontinent, Purewick in place and changed.  Large Bm per commode this shift.  ABNL LAB/BG: CR 1.21, K 3.4, Procal 43.26  DRAIN/DEVICES: R PIV infusing NS @ 75mL/hr with intermittent antibiotics   TELEMETRY RHYTHM: NA  SKIN: scattered bruising, blanchable redness to R hip and coccyx. Sacral mepilex in place. Skin breakdown under L breast- MD aware, WOC consult placed.  TESTS/PROCEDURES: none  D/C DAY/GOALS/PLACE: pending progress  OTHER IMPORTANT INFO: Hx of lewy body dementia. Per family, pt is baseline confused.

## 2023-05-16 NOTE — PROGRESS NOTES
[facility]    Medicine Progress Note - Hospitalist Service    Date of Admission:  5/14/2023    Assessment & Plan   Tracy Yee is a 79 yo female with history of Lewy body dementia; hypothyroidism; chronic disease; who presents with altered mental status and found with evidence of UTI.     On initial evaluation, patient was afebrile, hypertensive.  ECG showed sinus rhythm, signs of LVH, nonspecific T wave changes, no acute ischemic changes.  Labs notable for BMP with creatinine 1.13; CBC with normal white count, hemoglobin 1.4 and platelets 154; LFTs with AST 62 otherwise normal; lactic acid 1.3; procalcitonin 8.42; urinalysis showed 111 white blood cells, moderate leukocyte esterase, negative nitrites, large blood, few bacteria.  Head CT and head and neck CTA were negative for acute findings.  Patient was given a dose of ceftriaxone.     Sepsis due to UTI  E.coli bacteremia  * Initial presentation as above. Developed a fever up to 102.5 and tachycardia in the hospital.  - Admitted to inpatient.  - Urine culture growing E.coli, pansensitive.  - Blood cultures with 2/4 bottles positive for E.coli, sensitivities pending.  - Procalcitonin significantly elevated at 43.26.  - Continue ceftriaxone while in the hospital. Likely transition to cipro at the time of discharge. Plan for a 7 day total course due to the bacteremia.  - PT consulted.  - Discussed plan of care with her  in the room today.  - Possible discharge in 24-48 hours pending therapy evaluation and continued improvement.     AMS, suspect infectious/septic encephalopathy  Lewy body dementia  * Initial presentation as above.  Patient was confused and speaking incoherently which is not normal for her.  - Continue to treat infection as noted.  - Continue PTA carbidopa-levodopa and rivastigmine.  - Re-orient as needed.  - Maintain normal day/night, sleep/wake cycles.  - Minimize sedating medications as able.  -  REVIEW OF CARDIOVASCULAR SYSTEMS:  Cardiovascular problem(s): Shortness of Breath on Exertion    Patient does not smoke.    Patient does take aspirin.   Improved.     Acute kidney injury  CKD, stage 3a  * Patient noted chronic kidney disease and from review of her electronic medical record it appears that her creatinine had been mostly in the mid 1 range. Creatinine 1.13 in the ER on 5/14/23, then up to 1.45 on 5/15/23. Suspect due to sepsis.  - Creatinine improved to 1.21 on 5/16/23.  - IV fluids.  - Recheck labs in AM.  - Avoid nephrotoxic medications.     Hypothyroidism  - Continue PTA levothyroxine.        Diet: Combination Diet Regular Diet Adult    DVT Prophylaxis: Pneumatic Compression Devices  Ibarra Catheter: Not present  Lines: None     Cardiac Monitoring: None  Code Status: No CPR- Do NOT Intubate      Clinically Significant Risk Factors                  # Hypertension: Noted on problem list     # Dementia: noted on problem list             Disposition Plan      Expected Discharge Date: 05/18/2023      Destination: home with family            Joo Angelo MD  Hospitalist Service  [facility]  Securely message with [employer] (more info)  Text page via [employer] Paging/Directory   ______________________________________________________________________    Interval History   Tracy Yee was seen today. She seems to feel OK. No specific complaints/concerns for me. Her  was in the room with her today. He feels like her confusion has improved, but she is not back to baseline yet. Discussed hospital course to date.    Physical Exam   Vital Signs: Temp: 98.5  F (36.9  C) Temp src: Oral BP: (!) 162/104 Pulse: 92   Resp: 18 SpO2: 96 % O2 Device: None (Room air)    Weight: 101 lbs 6.59 oz    Constitutional: awake, alert, cooperative, no apparent distress, sitting up in a chair  Respiratory: no increased work of breathing, clear to auscultation bilaterally, no crackles or wheezing  Cardiovascular: regular rate and rhythm, normal S1 and S2, no murmur noted  GI: normal bowel sounds, soft, non-distended, non-tender  Skin: warm,  dry  Musculoskeletal: no lower extremity pitting edema present  Neurologic: awake, alert, hard of hearing, answers questions appropriately, poor short term memory, moves all extremities    Medical Decision Making       40 MINUTES SPENT BY ME on the date of service doing chart review, history, exam, documentation & further activities per the note.      Data     I have personally reviewed the following data over the past 24 hrs:    6.3  \   11.8   / 149 (L)     138 104 21.2 /  141 (H)   3.4 20 (L) 1.21 (H) \       Procal: 43.26 (HH) CRP: N/A Lactic Acid: N/A

## 2023-05-16 NOTE — PROVIDER NOTIFICATION
MD Notification    Notified Person: MD    Notified Person Name:  [email or web address]     Notification Date/Time:5/16/23 1204    Notification Interaction: [employer] messaging    Purpose of Notification: FYI: procalcitonin 43.26    Orders Received:    Comments:

## 2023-05-16 NOTE — CONSULTS
[facility]  WOC Nurse Inpatient Wound/skin Assessment     Reason for consultation: Evaluate and treat  Lt breast fold      Assessment  Lt breast fold: candida appearing rash.     Treatment Plan  Skin care: bilateral breast folds: BID  -Clean Daily with soap and water  -Generously dust with antifungal powder    Orders: placed in Epic  Recommended provider order: Please order antifungal powder to bilateral breast folds BId  WOC Nurse follow-up plan: weekly  Nursing to notify the Provider(s) and re-consult the WO Nurse if wound(s) deteriorates or new skin concern.    Patient History  According to provider note(s):   Tracy Yee is a 77 yo female with history of Lewy body dementia; hypothyroidism; chronic disease; who presents with altered mental status and found with evidence of UTI.     On initial evaluation, patient was afebrile, hypertensive.  ECG showed sinus rhythm, signs of LVH, nonspecific T wave changes, no acute ischemic changes.  Labs notable for BMP with creatinine 1.13; CBC with normal white count, hemoglobin 1.4 and platelets 154; LFTs with AST 62 otherwise normal; lactic acid 1.3; procalcitonin 8.42; urinalysis showed 111 white blood cells, moderate leukocyte esterase, negative nitrites, large blood, few bacteria.  Head CT and head and neck CTA were negative for acute findings.  Patient was given a dose of ceftriaxone.     Sepsis due to UTI  E.coli bacteremia  * Initial presentation as above. Developed a fever up to 102.5 and tachycardia in the hospital.  - Admitted to inpatient.  - Urine culture pending.  - Blood cultures with 2/4 bottles positive for E.coli, sensitivities pending.  - Continue ceftriaxone while awaiting further culture results.  - PT consult.  - Tried to update family today, but unable to get in contact with her .     AMS, suspect infectious/septic encephalopathy  Lewy body dementia  Objective Data  Containment of urine/stool: brief    Active Diet  Order:  Orders Placed This Encounter      Combination Diet Regular Diet Adult    Output:   I/O last 3 completed shifts:  In: 380 [P.O.:380]  Out: -     Risk Assessment:   Sensory Perception: 4-->no impairment  Moisture: 4-->rarely moist  Activity: 3-->walks occasionally  Mobility: 3-->slightly limited  Nutrition: 3-->adequate  Friction and Shear: 2-->potential problem  Gold Score: 19                          Labs: Recent Labs   Lab 05/15/23  0702 05/14/23  1810   ALBUMIN  --  4.1   HGB 10.3* 11.4*   WBC 9.0 7.2       Physical Exam  Skin inspection: bilateral breast folds Lt >Rt  Date of last WOC  photo:  5-15-23    Skin intact with dull erythema and satellite lesions. Lt >Rt   Temperature: warm}  Drainage: none  Odor: fungal odor  Pain: denies    Interventions  Current support surface: atmos air  Current off-loading measures: turning with heel suspension  Visual inspection of wound(s) completed  Supplies: request order for antifungal powder     ASHOK Webster

## 2023-05-16 NOTE — PLAN OF CARE
Goal Outcome Evaluation:  Cognitive Concerns/ Orientation : Alert to self only   BEHAVIOR & AGGRESSION TOOL COLOR: Green  CIWA SCORE: NA   ABNL VS/O2: VSS on room air   MOBILITY: Ax2, pivot, shuffling gait. PT consulted  PAIN MANAGMENT: no signs of pain  DIET: Regular  BOWEL/BLADDER: Incontinent, Purewick in place.  No BM this shift.   ABNL LAB/BG: Cr 1.45, AST 62, Procal 8.42. Lactic 0.9. Urine culture growing E-coli, MD notified.   DRAIN/DEVICES: R PIV infusing NS @ 75mL/hr with intermittent antibiotics   TELEMETRY RHYTHM: NA  SKIN: scattered bruising, blanchable redness to R hip and coccyx. Sacral mepilex in place. Skin breakdown under L breast- MD aware, WOC consult placed.  TESTS/PROCEDURES: none  D/C DAY/GOALS/PLACE: pending progress  OTHER IMPORTANT INFO: Hx of lewy body dementia. Per family, pt is baseline confused

## 2023-05-16 NOTE — PROVIDER NOTIFICATION
Urine culture from 5- at 1811 shows > 100,000 E-coli.  Patient is on Rocephin.  Call out to hospitalist service.

## 2023-05-16 NOTE — SIGNIFICANT EVENT
Significant Event Note    Time of event: 11:53 PM May 15, 2023    Description of event:  Urine Cx growing: >100,000 CFU/mL Escherichia coli Abnormal     E. Coli is pan-susceptible.     Plan:  -Continue Ceftriaxone.       Chandler Campbell MD

## 2023-05-17 ENCOUNTER — APPOINTMENT (OUTPATIENT)
Dept: PHYSICAL THERAPY | Facility: CLINIC | Age: 78
DRG: 871 | End: 2023-05-17
Payer: COMMERCIAL

## 2023-05-17 LAB
ANION GAP SERPL CALCULATED.3IONS-SCNC: 14 MMOL/L (ref 7–15)
BACTERIA BLD CULT: ABNORMAL
BACTERIA BLD CULT: ABNORMAL
BUN SERPL-MCNC: 16.2 MG/DL (ref 8–23)
CALCIUM SERPL-MCNC: 8.7 MG/DL (ref 8.8–10.2)
CHLORIDE SERPL-SCNC: 102 MMOL/L (ref 98–107)
CREAT SERPL-MCNC: 1.09 MG/DL (ref 0.51–0.95)
DEPRECATED HCO3 PLAS-SCNC: 22 MMOL/L (ref 22–29)
ERYTHROCYTE [DISTWIDTH] IN BLOOD BY AUTOMATED COUNT: 13.1 % (ref 10–15)
GFR SERPL CREATININE-BSD FRML MDRD: 51 ML/MIN/1.73M2
GLUCOSE SERPL-MCNC: 104 MG/DL (ref 70–99)
HCT VFR BLD AUTO: 35 % (ref 35–47)
HGB BLD-MCNC: 11.4 G/DL (ref 11.7–15.7)
MAGNESIUM SERPL-MCNC: 1.7 MG/DL (ref 1.7–2.3)
MCH RBC QN AUTO: 31.3 PG (ref 26.5–33)
MCHC RBC AUTO-ENTMCNC: 32.6 G/DL (ref 31.5–36.5)
MCV RBC AUTO: 96 FL (ref 78–100)
PLATELET # BLD AUTO: 150 10E3/UL (ref 150–450)
POTASSIUM SERPL-SCNC: 3 MMOL/L (ref 3.4–5.3)
POTASSIUM SERPL-SCNC: 3.7 MMOL/L (ref 3.4–5.3)
PROCALCITONIN SERPL IA-MCNC: 19.63 NG/ML
RBC # BLD AUTO: 3.64 10E6/UL (ref 3.8–5.2)
SODIUM SERPL-SCNC: 138 MMOL/L (ref 136–145)
WBC # BLD AUTO: 5.2 10E3/UL (ref 4–11)

## 2023-05-17 PROCEDURE — 97530 THERAPEUTIC ACTIVITIES: CPT | Mod: GP | Performed by: PHYSICAL THERAPIST

## 2023-05-17 PROCEDURE — 120N000001 HC R&B MED SURG/OB

## 2023-05-17 PROCEDURE — 99232 SBSQ HOSP IP/OBS MODERATE 35: CPT | Performed by: HOSPITALIST

## 2023-05-17 PROCEDURE — 250N000011 HC RX IP 250 OP 636: Performed by: INTERNAL MEDICINE

## 2023-05-17 PROCEDURE — 83735 ASSAY OF MAGNESIUM: CPT | Performed by: HOSPITALIST

## 2023-05-17 PROCEDURE — 36415 COLL VENOUS BLD VENIPUNCTURE: CPT | Performed by: HOSPITALIST

## 2023-05-17 PROCEDURE — 250N000013 HC RX MED GY IP 250 OP 250 PS 637: Performed by: HOSPITALIST

## 2023-05-17 PROCEDURE — 250N000013 HC RX MED GY IP 250 OP 250 PS 637: Performed by: INTERNAL MEDICINE

## 2023-05-17 PROCEDURE — 84145 PROCALCITONIN (PCT): CPT | Performed by: HOSPITALIST

## 2023-05-17 PROCEDURE — 85027 COMPLETE CBC AUTOMATED: CPT | Performed by: HOSPITALIST

## 2023-05-17 PROCEDURE — 84132 ASSAY OF SERUM POTASSIUM: CPT | Performed by: HOSPITALIST

## 2023-05-17 PROCEDURE — 80048 BASIC METABOLIC PNL TOTAL CA: CPT | Performed by: HOSPITALIST

## 2023-05-17 PROCEDURE — 258N000003 HC RX IP 258 OP 636: Performed by: INTERNAL MEDICINE

## 2023-05-17 RX ORDER — POTASSIUM CHLORIDE 1500 MG/1
40 TABLET, EXTENDED RELEASE ORAL ONCE
Status: COMPLETED | OUTPATIENT
Start: 2023-05-17 | End: 2023-05-17

## 2023-05-17 RX ADMIN — POTASSIUM CHLORIDE 40 MEQ: 1500 TABLET, EXTENDED RELEASE ORAL at 12:03

## 2023-05-17 RX ADMIN — CARBIDOPA AND LEVODOPA 1 TABLET: 25; 100 TABLET ORAL at 15:30

## 2023-05-17 RX ADMIN — Medication 1 MG: at 21:11

## 2023-05-17 RX ADMIN — ACETAMINOPHEN 650 MG: 325 TABLET ORAL at 21:11

## 2023-05-17 RX ADMIN — RIVASTIGMINE TARTRATE 3 MG: 1.5 CAPSULE ORAL at 08:45

## 2023-05-17 RX ADMIN — RIVASTIGMINE TARTRATE 3 MG: 1.5 CAPSULE ORAL at 17:32

## 2023-05-17 RX ADMIN — CARBIDOPA AND LEVODOPA 1 TABLET: 25; 100 TABLET ORAL at 21:11

## 2023-05-17 RX ADMIN — SODIUM CHLORIDE: 9 INJECTION, SOLUTION INTRAVENOUS at 21:09

## 2023-05-17 RX ADMIN — LEVOTHYROXINE SODIUM 75 MCG: 75 TABLET ORAL at 08:45

## 2023-05-17 RX ADMIN — CEFTRIAXONE SODIUM 1 G: 1 INJECTION, POWDER, FOR SOLUTION INTRAMUSCULAR; INTRAVENOUS at 21:08

## 2023-05-17 RX ADMIN — CARBIDOPA AND LEVODOPA 1 TABLET: 25; 100 TABLET ORAL at 08:45

## 2023-05-17 RX ADMIN — CARBIDOPA AND LEVODOPA 1 TABLET: 25; 100 TABLET ORAL at 17:32

## 2023-05-17 RX ADMIN — CARBIDOPA AND LEVODOPA 1 TABLET: 25; 100 TABLET ORAL at 12:03

## 2023-05-17 RX ADMIN — SODIUM CHLORIDE: 9 INJECTION, SOLUTION INTRAVENOUS at 08:12

## 2023-05-17 ASSESSMENT — ACTIVITIES OF DAILY LIVING (ADL)
ADLS_ACUITY_SCORE: 43
ADLS_ACUITY_SCORE: 38
ADLS_ACUITY_SCORE: 38
ADLS_ACUITY_SCORE: 40
ADLS_ACUITY_SCORE: 38
ADLS_ACUITY_SCORE: 38
ADLS_ACUITY_SCORE: 43
ADLS_ACUITY_SCORE: 40
ADLS_ACUITY_SCORE: 41
ADLS_ACUITY_SCORE: 38
ADLS_ACUITY_SCORE: 43
ADLS_ACUITY_SCORE: 43

## 2023-05-17 NOTE — PLAN OF CARE
Goal Outcome Evaluation:       Cognitive Concerns/ Orientation : Alert to self only   BEHAVIOR & AGGRESSION TOOL COLOR: Green, pt can be agitated with cares   CIWA SCORE: NA   ABNL VS/O2: VSS on room air   MOBILITY: Ax2, pivot, shuffling gait. PT following  PAIN MANAGMENT: no signs of pain, denies  DIET: Regular-poor appetite, pills crushed in applesauce, resistance at times, needs a lot of encouragement  BOWEL/BLADDER: Incontinent, Purewick in place and changed.  No BM this shift   ABNL LAB/BG: CR 1.21, K 3.4, Procal 43.26  DRAIN/DEVICES: R PIV infusing NS @ 75mL/hr with intermittent antibiotics   TELEMETRY RHYTHM: NA  SKIN: scattered bruising, blanchable redness to R hip and coccyx. Sacral mepilex in place. Skin breakdown under L breast- MD aware, WOC consult placed.  TESTS/PROCEDURES: none  D/C DAY/GOALS/PLACE: Pending progress  OTHER IMPORTANT INFO: Hx of lewy body dementia. Per family, pt is baseline confused.

## 2023-05-17 NOTE — PROGRESS NOTES
Care Management Follow Up    Length of Stay (days): 3    Expected Discharge Date: 05/18/2023     Concerns to be Addressed:       Patient plan of care discussed at interdisciplinary rounds: Yes    Anticipated Discharge Disposition: Transitional Care     Anticipated Discharge Services: Transportation Services  Anticipated Discharge DME: None    Patient/family educated on Medicare website which has current facility and service quality ratings: yes  Education Provided on the Discharge Plan: Yes  Patient/Family in Agreement with the Plan: yes    Referrals Placed by CM/SW: Post Acute Facilities  Private pay costs discussed: Not applicable    Additional Information:  Patient is requiring assistance.  PT recommendations are for TCU.  CC discussed therapy recommendations with patient's Spouse Farhat.  He has been thinking patient is needing more assistance than he feels he is able to manage.  His brother in law in a retired physician.  He has been talking to him, with his brother in law suggesting a TCU stay before transitioning home.  Offered to provide a listing of TCU facilities and ratings.  Farhat noted years ago his in laws had went to [facility] and he has heard good things, so he wanted a referral to [facility].  Hopefully they will be able to accept.  Farhat is aware of referral process.      Marva Coppola, RN   Inpatient Care Management  [phone]

## 2023-05-17 NOTE — PROGRESS NOTES
[facility]    Medicine Progress Note - Hospitalist Service    Date of Admission:  5/14/2023    Assessment & Plan   Tracy Yee is a 77 yo female with history of Lewy body dementia; hypothyroidism; chronic disease; who presents with altered mental status and found with evidence of UTI.     On initial evaluation, patient was afebrile, hypertensive.  ECG showed sinus rhythm, signs of LVH, nonspecific T wave changes, no acute ischemic changes.  Labs notable for BMP with creatinine 1.13; CBC with normal white count, hemoglobin 1.4 and platelets 154; LFTs with AST 62 otherwise normal; lactic acid 1.3; procalcitonin 8.42; urinalysis showed 111 white blood cells, moderate leukocyte esterase, negative nitrites, large blood, few bacteria.  Head CT and head and neck CTA were negative for acute findings.  Patient was given a dose of ceftriaxone.     Sepsis due to UTI  E.coli bacteremia  * Initial presentation as above. Developed a fever up to 102.5 and tachycardia in the hospital.  - Admitted to inpatient.  - Urine culture growing E.coli, pansensitive.  - Blood cultures with 2/4 bottles positive for E.coli, sensitivities pending.  - Procalcitonin significantly elevated at 43.26 on 5/16/23, improved to 19.63 on 5/17/23.  - Continue ceftriaxone while in the hospital, currently on day #4. Likely transition to cipro at the time of discharge. Plan for a 7 day total course due to the bacteremia.  - PT consulted, recommending home with assistance and home care. Will need to discuss with her  to see if he is able to meet her needs at home or if she may need temporary TCU.  - Possible discharge in 24-48 hours pending continued clinical improvement, labs, therapy, safe discharge plan.     AMS, suspect infectious/septic encephalopathy  Lewy body dementia  * Initial presentation as above.  Patient was confused and speaking incoherently which is not normal for her.  - Continue to treat infection as  noted.  - Continue PTA carbidopa-levodopa and rivastigmine.  - Re-orient as needed.  - Maintain normal day/night, sleep/wake cycles.  - Minimize sedating medications as able.  - Improved.     Acute kidney injury  CKD, stage 3a  * Patient noted chronic kidney disease and from review of her electronic medical record it appears that her creatinine had been mostly in the mid 1 range. Creatinine 1.13 in the ER on 5/14/23, then up to 1.45 on 5/15/23. Suspect due to sepsis.  - Creatinine improved to 1.09 on 5/17/23.  - IV fluids.  - Recheck labs in AM.  - Avoid nephrotoxic medications.     Hypothyroidism  - Continue PTA levothyroxine.     Hypokalemia  - Replace and recheck per protocol.  - Check magnesium.       Diet: Combination Diet Regular Diet Adult    DVT Prophylaxis: Pneumatic Compression Devices  Ibarra Catheter: Not present  Lines: None     Cardiac Monitoring: None  Code Status: No CPR- Do NOT Intubate      Clinically Significant Risk Factors        # Hypokalemia: Lowest K = 3 mmol/L in last 2 days, will replace as needed           # Hypertension: Noted on problem list     # Dementia: noted on problem list             Disposition Plan      Expected Discharge Date: 05/18/2023      Destination: home with family            Joo Angelo MD  Hospitalist Service  [facility]  Securely message with [employer] (more info)  Text page via [facility] Paging/Directory   ______________________________________________________________________    Interval History   Tracy Yee was seen this morning. She was sleeping in a chair when I saw her. Briefly woke her up, she seemed to be feeling OK, no specific complaints/concerns for me except feeling tired. No family present this morning.    Physical Exam   Vital Signs: Temp: 97.6  F (36.4  C) Temp src: Oral BP: (!) 158/100 Pulse: 82   Resp: 18 SpO2: 96 % O2 Device: None (Room air)    Weight: 107 lbs 2.3 oz    Constitutional: awake, drowsy, cooperative, no  apparent distress, sitting in a chair  Respiratory: no increased work of breathing, clear to auscultation bilaterally, no crackles or wheezing  Cardiovascular: regular rate and rhythm, normal S1 and S2, no murmur noted  GI: normal bowel sounds, soft, non-distended, non-tender  Skin: warm, dry  Musculoskeletal: no lower extremity pitting edema present  Neurologic: awake, drowsy, hard of hearing    Medical Decision Making       40 MINUTES SPENT BY ME on the date of service doing chart review, history, exam, documentation & further activities per the note.      Data     I have personally reviewed the following data over the past 24 hrs:    5.2  \   11.4 (L)   / 150     138 102 16.2 /  104 (H)   3.0 (L) 22 1.09 (H) \       Procal: 19.63 (HH) CRP: N/A Lactic Acid: N/A

## 2023-05-17 NOTE — PROVIDER NOTIFICATION
.MD Notification    Notified Person: MD    Notified Person Name:Alekseylosherri    Notification Date/Time:5/17/23 925    Notification Interaction:Vocera paging    Purpose of Notification:Procal critical 19.63, K low 3.0    Orders Received:None yet    Comments:CTNINA

## 2023-05-17 NOTE — PLAN OF CARE
Goal Outcome Evaluation:       Summary:  AMS, UTI  DATE & TIME: 5/17/2023 8523-0553  Cognitive Concerns/ Orientation : Alert to self only, confused   BEHAVIOR & AGGRESSION TOOL COLOR: Green, pt can be agitated with cares at times  CIWA SCORE: NA   ABNL VS/O2: VSS on room air   MOBILITY: Ax2, pivot, shuffling gait. PT following.  Patient does better with gait belt with 2 staff rather than walker.  PAIN MANAGMENT: no signs of pain, denies  DIET: Regular-poor appetite, pills crushed in applesauce, resistance at times, needs a lot of encouragement  BOWEL/BLADDER: Incontinent, Purewick in place and changed.  No BM this shift   ABNL LAB/BG: K 3.0-replaced per protocol, recheck level this afternoon 3.7, Procal trending down 19.63, CR 1.09, Mg 1.7  DRAIN/DEVICES: R PIV infusing NS @ 75mL/hr with intermittent antibiotics   TELEMETRY RHYTHM: NA  SKIN: scattered bruising, blanchable redness to R hip and coccyx. Sacral mepilex in place. Skin breakdown under L breast- MD aware, WOC consult placed.  TESTS/PROCEDURES: none  D/C DAY/GOALS/PLACE: Pending progress- home with home care/ vs TCU  OTHER IMPORTANT INFO: Hx of lewy body dementia. Per family, pt is baseline confused.

## 2023-05-17 NOTE — PROGRESS NOTES
05/16/23 1030   Appointment Info   Signing Clinician's Name / Credentials (PT) Ebony Greene PT   Living Environment   People in Home spouse   Current Living Arrangements independent living facility   Home Accessibility no concerns   Transportation Anticipated family or friend will provide   Self-Care   Usual Activity Tolerance good   Current Activity Tolerance fair   Equipment Currently Used at Home walker, rolling   Fall history within last six months no   Activity/Exercise/Self-Care Comment home health aide 3-4 x week; per spouse pt able to make needs know, supervision and cues for ADLs   General Information   Onset of Illness/Injury or Date of Surgery 05/15/23   Referring Physician Joo Angelo MD   Pertinent History of Current Problem (include personal factors and/or comorbidities that impact the POC) 79 yo female with history of Lewy body dementia; hypothyroidism; chronic disease; who presents   Existing Precautions/Restrictions fall   Cognition   Affect/Mental Status (Cognition) confused;agitated   Orientation Status (Cognition) oriented to;person   Follows Commands (Cognition) follows one-step commands;25-49% accuracy;increased processing time needed;physical/tactile prompts required;repetition of directions required;verbal cues/prompting required;refused to attempt   Behavioral Issues uncooperative   Safety Deficit (Cognition) ability to follow commands;at risk behavior observed;safety precautions follow-through/compliance   Posture    Posture Kyphosis   Range of Motion (ROM)   Range of Motion ROM is WNL   Strength (Manual Muscle Testing)   Strength (Manual Muscle Testing) Deficits observed during functional mobility   Bed Mobility   Comment, (Bed Mobility) not assessed   Transfers   Comment, (Transfers) Min A w WW   Gait/Stairs (Locomotion)   Comment, (Gait/Stairs) min A w WW dec step length slow pace shuffling, resistant to redirection   Balance   Balance Comments unsteady in standing  and walking, narrowed ROYER   Clinical Impression   Criteria for Skilled Therapeutic Intervention Yes, treatment indicated   PT Diagnosis (PT) UTI   Influenced by the following impairments dec strength, impaired gait, dec indep transfers   Functional limitations due to impairments impaired mobility   Clinical Presentation (PT Evaluation Complexity) Evolving/Changing   Clinical Decision Making (Complexity) low complexity   Planned Therapy Interventions (PT) bed mobility training;gait training;balance training;strengthening;stretching;transfer training   Risk & Benefits of therapy have been explained evaluation/treatment results reviewed;care plan/treatment goals reviewed;risks/benefits reviewed   PT Discharge Planning   PT Plan cont transfers and gait   PT Discharge Recommendation (DC Rec) home with home care physical therapy;Leaving home requires significant assistance;Leaving home requires significant taxing effort   PT Rationale for DC Rec Per spouse pt is below her mod indep to SB for mobility req A 1-2 for mobility and ADLS, pt easily agitated difficult to redirect with either staff or spouse, will plan for trial PT for mobility and strength maintainance, anticipate pt to have improved lucidity with ABX treatment, will need 24/7 if pt to return home, SW to confirm with spouse ability to meet pts needs, anticipating pt will need inc services upon DC to home, home PT/OT

## 2023-05-18 VITALS
RESPIRATION RATE: 16 BRPM | TEMPERATURE: 97.3 F | HEART RATE: 82 BPM | DIASTOLIC BLOOD PRESSURE: 97 MMHG | OXYGEN SATURATION: 96 % | WEIGHT: 107.14 LBS | SYSTOLIC BLOOD PRESSURE: 155 MMHG | HEIGHT: 64 IN | BODY MASS INDEX: 18.29 KG/M2

## 2023-05-18 LAB
ANION GAP SERPL CALCULATED.3IONS-SCNC: 14 MMOL/L (ref 7–15)
BUN SERPL-MCNC: 17.7 MG/DL (ref 8–23)
CALCIUM SERPL-MCNC: 8.7 MG/DL (ref 8.8–10.2)
CHLORIDE SERPL-SCNC: 104 MMOL/L (ref 98–107)
CREAT SERPL-MCNC: 1.07 MG/DL (ref 0.51–0.95)
DEPRECATED HCO3 PLAS-SCNC: 21 MMOL/L (ref 22–29)
GFR SERPL CREATININE-BSD FRML MDRD: 53 ML/MIN/1.73M2
GLUCOSE SERPL-MCNC: 116 MG/DL (ref 70–99)
POTASSIUM SERPL-SCNC: 3.3 MMOL/L (ref 3.4–5.3)
PROCALCITONIN SERPL IA-MCNC: 11.13 NG/ML
SODIUM SERPL-SCNC: 139 MMOL/L (ref 136–145)

## 2023-05-18 PROCEDURE — 36415 COLL VENOUS BLD VENIPUNCTURE: CPT | Performed by: HOSPITALIST

## 2023-05-18 PROCEDURE — 84145 PROCALCITONIN (PCT): CPT | Performed by: HOSPITALIST

## 2023-05-18 PROCEDURE — 250N000013 HC RX MED GY IP 250 OP 250 PS 637: Performed by: HOSPITALIST

## 2023-05-18 PROCEDURE — 250N000013 HC RX MED GY IP 250 OP 250 PS 637: Performed by: INTERNAL MEDICINE

## 2023-05-18 PROCEDURE — 82310 ASSAY OF CALCIUM: CPT | Performed by: HOSPITALIST

## 2023-05-18 PROCEDURE — 99239 HOSP IP/OBS DSCHRG MGMT >30: CPT | Performed by: HOSPITALIST

## 2023-05-18 PROCEDURE — 258N000003 HC RX IP 258 OP 636: Performed by: INTERNAL MEDICINE

## 2023-05-18 RX ORDER — AMLODIPINE BESYLATE 5 MG/1
5 TABLET ORAL DAILY
DISCHARGE
Start: 2023-05-18

## 2023-05-18 RX ORDER — POTASSIUM CHLORIDE 1500 MG/1
20 TABLET, EXTENDED RELEASE ORAL ONCE
Status: COMPLETED | OUTPATIENT
Start: 2023-05-18 | End: 2023-05-18

## 2023-05-18 RX ORDER — AMLODIPINE BESYLATE 5 MG/1
5 TABLET ORAL DAILY
Status: DISCONTINUED | OUTPATIENT
Start: 2023-05-18 | End: 2023-05-18 | Stop reason: HOSPADM

## 2023-05-18 RX ORDER — CIPROFLOXACIN 500 MG/1
500 TABLET, FILM COATED ORAL 2 TIMES DAILY
DISCHARGE
Start: 2023-05-18 | End: 2023-05-21

## 2023-05-18 RX ADMIN — SODIUM CHLORIDE: 9 INJECTION, SOLUTION INTRAVENOUS at 11:04

## 2023-05-18 RX ADMIN — RIVASTIGMINE TARTRATE 3 MG: 1.5 CAPSULE ORAL at 08:17

## 2023-05-18 RX ADMIN — ACETAMINOPHEN 650 MG: 325 TABLET ORAL at 11:04

## 2023-05-18 RX ADMIN — LEVOTHYROXINE SODIUM 75 MCG: 75 TABLET ORAL at 08:17

## 2023-05-18 RX ADMIN — CARBIDOPA AND LEVODOPA 1 TABLET: 25; 100 TABLET ORAL at 12:38

## 2023-05-18 RX ADMIN — AMLODIPINE BESYLATE 5 MG: 5 TABLET ORAL at 12:38

## 2023-05-18 RX ADMIN — CARBIDOPA AND LEVODOPA 1 TABLET: 25; 100 TABLET ORAL at 08:17

## 2023-05-18 RX ADMIN — POTASSIUM CHLORIDE 20 MEQ: 1500 TABLET, EXTENDED RELEASE ORAL at 10:54

## 2023-05-18 ASSESSMENT — ACTIVITIES OF DAILY LIVING (ADL)
ADLS_ACUITY_SCORE: 42
ADLS_ACUITY_SCORE: 42
ADLS_ACUITY_SCORE: 38
ADLS_ACUITY_SCORE: 38
ADLS_ACUITY_SCORE: 42
ADLS_ACUITY_SCORE: 38
ADLS_ACUITY_SCORE: 42
ADLS_ACUITY_SCORE: 38

## 2023-05-18 NOTE — PROVIDER NOTIFICATION
.MD Notification    Notified Person: MD    Notified Person Name:Dr. Angelo    Notification Date/Time:5/18 1050    Notification Interaction:Web paging    Purpose of Notification:Procal 11.13, trending down    Orders Received:No new orders    Comments:

## 2023-05-18 NOTE — PLAN OF CARE
Goal Outcome Evaluation:             Cognitive Concerns/ Orientation : Alert to self only, confused   BEHAVIOR & AGGRESSION TOOL COLOR: Green, pt can be agitated with cares at times  CIWA SCORE: NA   ABNL VS/O2: VSS on room air   MOBILITY: Ax2, pivot, shuffling gait. PT following.  Patient does better with gait belt with 2 staff rather than walker.  PAIN MANAGMENT: no signs of pain, denies  DIET: Regular-poor appetite, pills crushed in applesauce, resistance at times, needs a lot of encouragement  BOWEL/BLADDER: Incontinent, Purewick in place and changed.  No BM this shift   ABNL LAB/BG: K 3.0, replaced and recheck 3.7. Procal trending down 19.63, CR 1.09, Mg 1.7  DRAIN/DEVICES: R PIV infusing NS @ 75mL/hr with intermittent antibiotics   TELEMETRY RHYTHM: NA  SKIN: scattered bruising, blanchable redness to R hip and coccyx. Sacral mepilex in place. Skin breakdown under L breast- MD aware, WOC consult placed.  TESTS/PROCEDURES: none  D/C DAY/GOALS/PLACE: Pending progress- home with home care/ vs TCU  OTHER IMPORTANT INFO: Hx of lewy body dementia. Per family, pt is baseline confused.

## 2023-05-18 NOTE — PLAN OF CARE
Goal Outcome Evaluation:       Summary:  AMS, UTI  DATE & TIME: 5/18/23 1283-9854  Cognitive Concerns/ Orientation : Alert to self only, confused   BEHAVIOR & AGGRESSION TOOL COLOR: Green, calm and cooperative  CIWA SCORE: NA   ABNL VS/O2: VSS on room air   MOBILITY: Ax2, pivot, shuffling gait. Patient does better with gait belt with 2 staff rather than walker.  PAIN MANAGMENT: Tylenol given for generalized achiness  DIET: Regular-good appetite  BOWEL/BLADDER: Incontinent at times, had BM today  ABNL LAB/BG: K 3.3 replaced, Procal 11.13, CR 1.07  DRAIN/DEVICES: Iv removed   TELEMETRY RHYTHM: NA  SKIN: scattered bruising, blanchable redness to R hip and coccyx. Sacral mepilex in place. Skin breakdown under L breast  TESTS/PROCEDURES: none  D/C DAY/GOALS/PLACE: TCU today  OTHER IMPORTANT INFO: Hx of lewy body dementia. Per family, pt is baseline confused.

## 2023-05-18 NOTE — DISCHARGE SUMMARY
[facility]  Hospitalist Discharge Summary      Date of Admission:  5/14/2023  Date of Discharge:  5/18/2023  Discharging Provider: Joo Angelo MD  Discharge Service: Hospitalist Service    Discharge Diagnoses   Sepsis due to UTI  E.coli bacteremia  AMS, suspect infectious/septic encephalopathy  Lewy body dementia  Acute kidney injury  CKD, stage 3a  Hypothyroidism  Hypertension  Hypokalemia    Clinically Significant Risk Factors          Follow-ups Needed After Discharge   Follow-up Appointments     Follow Up and recommended labs and tests      Follow up with jail physician.  The following labs/tests are   recommended: BMP.             Unresulted Labs Ordered in the Past 30 Days of this Admission     Date and Time Order Name Status Description    5/14/2023  6:08 PM Blood Culture Peripheral Blood Preliminary       These results will be followed up by Dr. Angelo    Discharge Disposition   Discharged to short-term care facility  Condition at discharge: Stable    Hospital Course   Tracy Yee is a 79 yo female with history of Lewy body dementia; hypothyroidism; chronic disease; who presents with altered mental status and found with evidence of UTI.     On initial evaluation, patient was afebrile, hypertensive.  ECG showed sinus rhythm, signs of LVH, nonspecific T wave changes, no acute ischemic changes.  Labs notable for BMP with creatinine 1.13; CBC with normal white count, hemoglobin 1.4 and platelets 154; LFTs with AST 62 otherwise normal; lactic acid 1.3; procalcitonin 8.42; urinalysis showed 111 white blood cells, moderate leukocyte esterase, negative nitrites, large blood, few bacteria.  Head CT and head and neck CTA were negative for acute findings.  Patient was given a dose of ceftriaxone.     Sepsis due to UTI  E.coli bacteremia  * Initial presentation as above. Developed a fever up to 102.5 and tachycardia in the hospital.  - Admitted to inpatient.  - Urine culture  grew E.coli, pansensitive.  - Blood cultures with 2/4 bottles positive for E.coli, pansensitive.  - Procalcitonin significantly elevated at 43.26 on 5/16/23, improved to 19.63 on 5/17/23 and 11.13 on 5/18/23.  - Received 4 days of ceftriaxone while in the hospital, will transition to cipro 500 mg BID starting tonight, will plan for 6 more doses to complete a 7 day total course of antibiotics due to her bacteremia.  - PT consulted, ultimately recommended TCU.  - Discharge to TCU today.  - Follow-up with TCU provider.     AMS, suspect infectious/septic encephalopathy  Lewy body dementia  * Initial presentation as above.  Patient was confused and speaking incoherently which is not normal for her.  - Continue to treat infection as noted.  - Continue PTA carbidopa-levodopa and rivastigmine.  - Re-orient as needed.  - Maintain normal day/night, sleep/wake cycles.  - Minimize sedating medications as able.  - Mental status much improved. Still drowsy at times, but overall confusion has improved.     Acute kidney injury  CKD, stage 3a  * Patient noted chronic kidney disease and from review of her electronic medical record it appears that her creatinine had been mostly in the mid 1 range. Creatinine 1.13 in the ER on 5/14/23, then up to 1.45 on 5/15/23. Suspect due to sepsis.  - Creatinine improved with IV fluids to 1.07 on 5/18/23.  - Recheck labs at TCU.    Hypothyroidism  - Continue PTA levothyroxine.     Hypertension  Not on medication prior to admission. She had been on blood pressure meds in the past, but they were subsequently discontinued due to low blood pressures. Was briefly on midodrine due to low blood pressures, but didn't tolerate it and it was stopped after a few doses. Blood pressure persistently elevated in the hospital. Discussed with patient and her , they would like to try initiating an anti-hypertensive, will need to monitor blood pressures closely.  - Start amlodipine 5 mg daily.  - Monitor BP at  TCU and adjust antihypertensives as indicated.    Hypokalemia  - Replaced.  - Recheck at TCU.    Consultations This Hospital Stay   CARE MANAGEMENT / SOCIAL WORK IP CONSULT  WOUND OSTOMY CONTINENCE NURSE  IP CONSULT  PHYSICAL THERAPY ADULT IP CONSULT  PHYSICAL THERAPY ADULT IP CONSULT  OCCUPATIONAL THERAPY ADULT IP CONSULT    Code Status   No CPR- Do NOT Intubate    Time Spent on this Encounter   I, Joo Angelo MD, personally saw the patient today and spent greater than 30 minutes discharging this patient.       Joo Angelo MD  [place] [address] [facility]  [address] [place] [address]  Phone: [phone]  ______________________________________________________________________    Physical Exam   Vital Signs: Temp: 97.3  F (36.3  C) Temp src: Oral BP: (!) 155/97 Pulse: 82   Resp: 16 SpO2: 96 % O2 Device: None (Room air)    Weight: 107 lbs 2.3 oz  Constitutional: awake, alert, cooperative, no apparent distress, sitting up in a chair  Respiratory: no increased work of breathing, clear to auscultation bilaterally, no crackles or wheezing  Cardiovascular: regular rate and rhythm, normal S1 and S2, no murmur noted  GI: normal bowel sounds, soft, non-distended, non-tender  Skin: warm, dry  Musculoskeletal: no lower extremity pitting edema present  Neurologic: awake, alert, hard of hearing, answers questions appropriately, poor short term memory, moves all extremities       Primary Care Physician   Nilam Dunn    Discharge Orders      General info for SNF    Length of Stay Estimate: Short Term Care: Estimated # of Days <30  Condition at Discharge: Improving  Level of care:skilled   Rehabilitation Potential: Fair  Admission H&P remains valid and up-to-date: Yes  Recent Chemotherapy: N/A  Use Nursing Home Standing Orders: Yes     Mantoux instructions    Give two-step Mantoux (PPD) Per Facility Policy Yes     Follow Up and recommended labs and tests    Follow up with Nursing  home physician.  The following labs/tests are recommended: BMP.     Reason for your hospital stay    E.coli UTI and bacteremia     Activity - Up with nursing assistance     Activity - Up with assistive device     No CPR- Do NOT Intubate     Physical Therapy Adult Consult    Evaluate and treat as clinically indicated.    Reason:  generalized weakness     Occupational Therapy Adult Consult    Evaluate and treat as clinically indicated.    Reason:  generalized weakness     Fall precautions     Diet    Follow this diet upon discharge: Regular Diet Adult     Significant Results and Procedures   Most Recent 3 CBC's:Recent Labs   Lab Test 05/17/23  0747 05/16/23  1056 05/15/23  0702   WBC 5.2 6.3 9.0   HGB 11.4* 11.8 10.3*   MCV 96 96 97    149* 133*     Most Recent 3 BMP's:Recent Labs   Lab Test 05/18/23  0916 05/17/23  1646 05/17/23  0747 05/16/23  1056     --  138 138   POTASSIUM 3.3* 3.7 3.0* 3.4   CHLORIDE 104  --  102 104   CO2 21*  --  22 20*   BUN 17.7  --  16.2 21.2   CR 1.07*  --  1.09* 1.21*   ANIONGAP 14  --  14 14   HARDEEP 8.7*  --  8.7* 9.0   *  --  104* 141*     Results for orders placed or performed during the hospital encounter of 05/14/23   Head CT w/o contrast    Narrative    EXAM: CTA HEAD NECK W CONTRAST, CT HEAD W/O CONTRAST  LOCATION: [facility]  DATE/TIME: 5/14/2023 7:07 PM CDT    INDICATION: increased confusion, weakness, trouble speaking (hx of dementia)  COMPARISON: 04/12/2022  CONTRAST: 75mL Isovue 370  TECHNIQUE: Head and neck CT angiogram with IV contrast. Noncontrast head CT followed by axial helical CT images of the head and neck vessels obtained during the arterial phase of intravenous contrast administration. Axial 2D reconstructed images and   multiplanar 3D MIP reconstructed images of the head and neck vessels were performed by the technologist. Dose reduction techniques were used. All stenosis measurements made according to NASCET criteria  unless otherwise specified.    FINDINGS:   NONCONTRAST HEAD CT:   INTRACRANIAL CONTENTS: No intracranial hemorrhage, extraaxial collection, or mass effect.  No CT evidence of acute infarct. Mild presumed chronic small vessel ischemic changes. Mild to moderate generalized volume loss. No hydrocephalus.     VISUALIZED ORBITS/SINUSES/MASTOIDS: Prior bilateral cataract surgery. Visualized portions of the orbits are otherwise unremarkable. Mild mucosal thickening of the maxillary sinuses. No middle ear or mastoid effusion.    BONES/SOFT TISSUES: No acute abnormality.    HEAD CTA:  ANTERIOR CIRCULATION: Trace atherosclerotic calcification in the carotid siphons. No stenosis/occlusion, aneurysm, or high flow vascular malformation. Standard [place] anatomy.    POSTERIOR CIRCULATION: No stenosis/occlusion, aneurysm, or high flow vascular malformation. Balanced vertebral arteries supply a normal basilar artery.     DURAL VENOUS SINUSES: Expected enhancement of the major dural venous sinuses.    NECK CTA:  RIGHT CAROTID: Trace atherosclerotic plaque at the carotid bifurcation. No measurable stenosis or dissection.    LEFT CAROTID: Atherosclerotic plaque results in less than 50% stenosis in the left ICA. No dissection.    VERTEBRAL ARTERIES: No focal stenosis or dissection. Balanced vertebral arteries.    AORTIC ARCH: Classic aortic arch anatomy with no significant stenosis at the origin of the great vessels.    NONVASCULAR STRUCTURES: Reversal of the cervical spine lordosis with multilevel degenerative anterolisthesis from C2 through C5. Age-indeterminate mild compression deformity at T2. No high-grade spinal canal stenosis. Left greater than right neural   foraminal narrowing. Bilateral pleural effusions. Scarring of the lung apices.      Impression    IMPRESSION:   HEAD CT:  1.  No CT evidence for acute intracranial process.  2.  Brain atrophy and presumed chronic microvascular ischemic changes as above.    HEAD  CTA:   1.  No significant stenosis, aneurysm, or high flow vascular malformation identified.    NECK CTA:  1.  No hemodynamically significant stenosis in the neck vessels.   2.  No evidence for dissection.   CTA Head Neck with Contrast    Narrative    EXAM: CTA HEAD NECK W CONTRAST, CT HEAD W/O CONTRAST  LOCATION: [facility]  DATE/TIME: 5/14/2023 7:07 PM CDT    INDICATION: increased confusion, weakness, trouble speaking (hx of dementia)  COMPARISON: 04/12/2022  CONTRAST: 75mL Isovue 370  TECHNIQUE: Head and neck CT angiogram with IV contrast. Noncontrast head CT followed by axial helical CT images of the head and neck vessels obtained during the arterial phase of intravenous contrast administration. Axial 2D reconstructed images and   multiplanar 3D MIP reconstructed images of the head and neck vessels were performed by the technologist. Dose reduction techniques were used. All stenosis measurements made according to NASCET criteria unless otherwise specified.    FINDINGS:   NONCONTRAST HEAD CT:   INTRACRANIAL CONTENTS: No intracranial hemorrhage, extraaxial collection, or mass effect.  No CT evidence of acute infarct. Mild presumed chronic small vessel ischemic changes. Mild to moderate generalized volume loss. No hydrocephalus.     VISUALIZED ORBITS/SINUSES/MASTOIDS: Prior bilateral cataract surgery. Visualized portions of the orbits are otherwise unremarkable. Mild mucosal thickening of the maxillary sinuses. No middle ear or mastoid effusion.    BONES/SOFT TISSUES: No acute abnormality.    HEAD CTA:  ANTERIOR CIRCULATION: Trace atherosclerotic calcification in the carotid siphons. No stenosis/occlusion, aneurysm, or high flow vascular malformation. Standard [place] anatomy.    POSTERIOR CIRCULATION: No stenosis/occlusion, aneurysm, or high flow vascular malformation. Balanced vertebral arteries supply a normal basilar artery.     DURAL VENOUS SINUSES: Expected enhancement of  the major dural venous sinuses.    NECK CTA:  RIGHT CAROTID: Trace atherosclerotic plaque at the carotid bifurcation. No measurable stenosis or dissection.    LEFT CAROTID: Atherosclerotic plaque results in less than 50% stenosis in the left ICA. No dissection.    VERTEBRAL ARTERIES: No focal stenosis or dissection. Balanced vertebral arteries.    AORTIC ARCH: Classic aortic arch anatomy with no significant stenosis at the origin of the great vessels.    NONVASCULAR STRUCTURES: Reversal of the cervical spine lordosis with multilevel degenerative anterolisthesis from C2 through C5. Age-indeterminate mild compression deformity at T2. No high-grade spinal canal stenosis. Left greater than right neural   foraminal narrowing. Bilateral pleural effusions. Scarring of the lung apices.      Impression    IMPRESSION:   HEAD CT:  1.  No CT evidence for acute intracranial process.  2.  Brain atrophy and presumed chronic microvascular ischemic changes as above.    HEAD CTA:   1.  No significant stenosis, aneurysm, or high flow vascular malformation identified.    NECK CTA:  1.  No hemodynamically significant stenosis in the neck vessels.   2.  No evidence for dissection.     Discharge Medications   Current Discharge Medication List      START taking these medications    Details   amLODIPine (NORVASC) 5 MG tablet Take 1 tablet (5 mg) by mouth daily    Associated Diagnoses: Hypertension, unspecified type      ciprofloxacin (CIPRO) 500 MG tablet Take 1 tablet (500 mg) by mouth 2 times daily for 3 days    Associated Diagnoses: Acute cystitis without hematuria         CONTINUE these medications which have NOT CHANGED    Details   acetaminophen (TYLENOL) 500 MG tablet Take 500 mg by mouth every 4 hours as needed for mild pain      !! carbidopa-levodopa (SINEMET)  MG tablet Take 1 tablet by mouth 5 times daily Doses are spaced 3 hours apart (No specific time schedule --> dependant on when pt wakes up in the morning).      !!  carbidopa-levodopa (SINEMET)  MG tablet Take 1 tablet by mouth 2 times daily as needed (Breakthrough Parkinsons symptoms)      levothyroxine (SYNTHROID/LEVOTHROID) 75 MCG tablet Take 75 mcg by mouth daily      rivastigmine (EXELON) 3 MG capsule Take 3 mg by mouth 2 times daily (with meals)       !! - Potential duplicate medications found. Please discuss with provider.        Allergies   Allergies   Allergen Reactions     Sulfa Antibiotics      Sulfonamide antibiotics

## 2023-05-19 LAB — BACTERIA BLD CULT: NO GROWTH

## 2023-05-19 NOTE — PROGRESS NOTES
Care Management Discharge Note    Discharge Date: 05/18/2023       Discharge Disposition: Transitional Care    Discharge Services: Transportation Services    Discharge DME: None    Discharge Transportation:  ([facility] at [facility])    Private pay costs discussed: private room/amenity fees and transportation costs    Does the patient's insurance plan have a 3 day qualifying hospital stay waiver?  Yes   Will the waiver be used for post-acute placement? No    PAS Confirmation Code:  [id number]  Patient/family educated on Medicare website which has current facility and service quality ratings: yes    Education Provided on the Discharge Plan: Yes  Persons Notified of Discharge Plans: pt and   Patient/Family in Agreement with the Plan: yes    Handoff Referral Completed: No    Additional Information:  [facility] declined due to orientation level.  [facility] TCU full  [facility]. Writer offered this TCU to spouse. Explained the private room fee which begins on day 8 of stay.  accepting of this fee however hopes she will be able to discharge by then.  Together we discussed the TCU plan with patient.  She did not see the need for the TCU however did accept 's recommendation. [facility] did receive [employer] authorization and discharge coordinated with [facility] at [facility]. Spouse was here till [facility] came and patient did cooperate with transfer.  [facility] aware of patient's discharge to TCU.  PA approved.  Effective date: 5/18/23  PA reference #: [id number]  Pt. notified:     MAURICIO Vinson

## 2023-05-19 NOTE — PLAN OF CARE
Physical Therapy Discharge Summary    Reason for therapy discharge:    Discharged to transitional care facility.    Progress towards therapy goal(s). See goals on Care Plan in [facility] electronic health record for goal details.  Goals partially met.  Barriers to achieving goals:   discharge from facility.    Therapy recommendation(s):    Continued therapy is recommended.  Rationale/Recommendations:  To further increase independence with mobility.

## 2023-05-27 ENCOUNTER — LAB REQUISITION (OUTPATIENT)
Dept: LAB | Facility: CLINIC | Age: 78
End: 2023-05-27
Payer: COMMERCIAL

## 2023-05-27 DIAGNOSIS — A41.51 SEPSIS DUE TO ESCHERICHIA COLI (E. COLI) (H): ICD-10-CM

## 2023-05-30 LAB
ANION GAP SERPL CALCULATED.3IONS-SCNC: 11 MMOL/L (ref 7–15)
BUN SERPL-MCNC: 34 MG/DL (ref 8–23)
CALCIUM SERPL-MCNC: 9.1 MG/DL (ref 8.8–10.2)
CHLORIDE SERPL-SCNC: 103 MMOL/L (ref 98–107)
CREAT SERPL-MCNC: 1.39 MG/DL (ref 0.51–0.95)
DEPRECATED HCO3 PLAS-SCNC: 24 MMOL/L (ref 22–29)
ERYTHROCYTE [DISTWIDTH] IN BLOOD BY AUTOMATED COUNT: 13.5 % (ref 10–15)
GFR SERPL CREATININE-BSD FRML MDRD: 38 ML/MIN/1.73M2
GLUCOSE SERPL-MCNC: 103 MG/DL (ref 70–99)
HCT VFR BLD AUTO: 31.6 % (ref 35–47)
HGB BLD-MCNC: 10 G/DL (ref 11.7–15.7)
MCH RBC QN AUTO: 32.1 PG (ref 26.5–33)
MCHC RBC AUTO-ENTMCNC: 31.6 G/DL (ref 31.5–36.5)
MCV RBC AUTO: 101 FL (ref 78–100)
PLATELET # BLD AUTO: 262 10E3/UL (ref 150–450)
POTASSIUM SERPL-SCNC: 3.7 MMOL/L (ref 3.4–5.3)
RBC # BLD AUTO: 3.12 10E6/UL (ref 3.8–5.2)
SODIUM SERPL-SCNC: 138 MMOL/L (ref 136–145)
WBC # BLD AUTO: 6 10E3/UL (ref 4–11)

## 2023-05-30 PROCEDURE — 36415 COLL VENOUS BLD VENIPUNCTURE: CPT | Mod: ORL | Performed by: INTERNAL MEDICINE

## 2023-05-30 PROCEDURE — 80048 BASIC METABOLIC PNL TOTAL CA: CPT | Mod: ORL | Performed by: INTERNAL MEDICINE

## 2023-05-30 PROCEDURE — 85027 COMPLETE CBC AUTOMATED: CPT | Mod: ORL | Performed by: INTERNAL MEDICINE

## 2023-05-30 PROCEDURE — P9604 ONE-WAY ALLOW PRORATED TRIP: HCPCS | Mod: ORL | Performed by: INTERNAL MEDICINE

## 2023-11-04 ENCOUNTER — APPOINTMENT (OUTPATIENT)
Dept: GENERAL RADIOLOGY | Facility: CLINIC | Age: 78
End: 2023-11-04
Attending: EMERGENCY MEDICINE
Payer: COMMERCIAL

## 2023-11-04 ENCOUNTER — HOSPITAL ENCOUNTER (EMERGENCY)
Facility: CLINIC | Age: 78
Discharge: HOME | End: 2023-11-04
Attending: EMERGENCY MEDICINE | Admitting: EMERGENCY MEDICINE
Payer: COMMERCIAL

## 2023-11-04 VITALS
DIASTOLIC BLOOD PRESSURE: 105 MMHG | OXYGEN SATURATION: 94 % | HEART RATE: 81 BPM | SYSTOLIC BLOOD PRESSURE: 154 MMHG | TEMPERATURE: 98.1 F | RESPIRATION RATE: 12 BRPM

## 2023-11-04 DIAGNOSIS — S00.81XA FACIAL ABRASION, INITIAL ENCOUNTER: ICD-10-CM

## 2023-11-04 DIAGNOSIS — S63.259A FINGER DISLOCATION, INITIAL ENCOUNTER: ICD-10-CM

## 2023-11-04 DIAGNOSIS — S09.90XA CLOSED HEAD INJURY, INITIAL ENCOUNTER: ICD-10-CM

## 2023-11-04 DIAGNOSIS — Z86.59 HISTORY OF DEMENTIA: ICD-10-CM

## 2023-11-04 PROCEDURE — 26770 TREAT FINGER DISLOCATION: CPT | Mod: F4

## 2023-11-04 PROCEDURE — 99283 EMERGENCY DEPT VISIT LOW MDM: CPT | Mod: 25

## 2023-11-04 PROCEDURE — 73140 X-RAY EXAM OF FINGER(S): CPT | Mod: LT

## 2023-11-04 ASSESSMENT — ACTIVITIES OF DAILY LIVING (ADL): ADLS_ACUITY_SCORE: 35

## 2023-11-04 NOTE — ED NOTES
Pt back from imaging and placed on vital monitors. Purewick in place and pt re-educated on use. Adjusted in bed

## 2023-11-04 NOTE — DISCHARGE INSTRUCTIONS
As discussed, your wife has struck her head.  I have recommended a CT scan of the head and neck but you have elected not to pursue this imaging at this time.  Keep her abrasion clean and dry.  It would be important to return to us immediately if she develops any vomiting, sleepiness, or other emergent concerns.    Discharge Instructions  Head Injury    You have been seen today for a head injury. Your evaluation included a history and physical examination. You may have had a CT (CAT) scan performed, though most head injuries do not require a scan. Based on this evaluation, your provider today does not feel that your head injury is serious.    Generally, every Emergency Department visit should have a follow-up clinic visit with either a primary or a specialty clinic/provider. Please follow-up as instructed by your emergency provider today.  Return to the Emergency Department if:  You are confused or you are not acting right.  Your headache gets worse or you start to have a really bad headache even with your recommended treatment plan.  You vomit (throw up) more than once.  You have a seizure.  You have trouble walking.  You have weakness or paralysis (cannot move) in an arm or a leg.  You have blood or fluid coming from your ears or nose.  You have new symptoms or anything that worries you.    Sleeping:  It is okay for you to sleep, but someone should wake you up if instructed by your provider, and someone should check on you at your usual time to wake up.     Activity:  Do not drive for at least 24 hours.  Do not drive if you have dizzy spells or trouble concentrating, or remembering things.  Do not return to any contact sports until cleared by your regular provider.     MORE INFORMATION:    Concussion:  A concussion is a minor head injury that may cause temporary problems with the way the brain works. Although concussions are important, they are generally not an emergency or a reason that a person needs to be  hospitalized. Some concussion symptoms include confusion, amnesia (forgetful), nausea (sick to your stomach) and vomiting (throwing up), dizziness, fatigue, memory or concentration problems, irritability and sleep problems. For most people, concussions are mild and temporary but some will have more severe and persistent symptoms that require on-going care and treatment.  CT Scans: Your evaluation today may have included a CT scan (CAT scan) to look for things like bleeding or a skull fracture (broken bone).  CT scans involve radiation and too many CT scans can cause serious health problems like cancer, especially in children.  Because of this, your provider may not have ordered a CT scan today if they think you are at low risk for a serious or life threatening problem.    If you were given a prescription for medicine here today, be sure to read all of the information (including the package insert) that comes with your prescription.  This will include important information about the medicine, its side effects, and any warnings that you need to know about.  The pharmacist who fills the prescription can provide more information and answer questions you may have about the medicine.  If you have questions or concerns that the pharmacist cannot address, please call or return to the Emergency Department.     Remember that you can always come back to the Emergency Department if you are not able to see your regular provider in the amount of time listed above, if you get any new symptoms, or if there is anything that worries you.

## 2023-11-04 NOTE — ED TRIAGE NOTES
Patient  fell off the toilet  this morning.  No LOC reported . She  has an abrasion to the left side of her forehead and her left elbow. She  also has deformity noted to her left 5th finger   Triage Assessment (Adult)       Row Name 11/04/23 0519          Triage Assessment    Airway WDL WDL        Respiratory WDL    Respiratory WDL WDL        Skin Circulation/Temperature WDL    Skin Circulation/Temperature WDL WDL        Cardiac WDL    Cardiac WDL WDL        Peripheral/Neurovascular WDL    Peripheral Neurovascular WDL WDL

## 2023-11-04 NOTE — ED PROVIDER NOTES
History     Chief Complaint:  Fall     The history is provided by the spouse.      Tracy Yee is a 78 year old female with history of hypertension, stage 4 CKD, Parkinson's, and dementia who presents to the ED with her  for evaluation of a fall. The patient's  reports that she has bars next to her toilet, but earlier today she stood up too fast and fell onto her butt trying to get off the toilet. Her  was with her assisting and saw the fall. He states she does get dizzy at baseline and felt dizzy before the fall, but denies fainting. She did hit hear head, but she doesn't recall if it was on the toilet or on the ground. Endorses some right shoulder pain. Patient took Tylenol after the fall. Denies back pain or headache. Denies blood thinners use.    Independent Historian:    Patient's  provides above history.     Review of External Notes:  None    Medications:    Norvasc   Sinemet  Synthroid  Exelon  Xanax   Zoloft     Past Medical History:    Breast cancer  Hypertension  Osteopenia  Parkinson's disease  Prediabetes  Thyroid disease  Hypothyroidism  Tubular adenoma of colon  Prolapse of uterus   Malignant neoplasm of breast  Acute cystitis without hematuria  Severe Lewy body dementia  Stage 4 CKD  Orthostatic hypotension   Recurrent UTI  Protein-calorie malnutrition   Vitamin B12 deficiency   Cystocele  Rectocele     Past Surgical History:    Left lumpectomy   Colonoscopy x4  Hysterectomy   Bilateral phacoemulsification clear cornea with standard IOL    Physical Exam   No data found.     Physical Exam  General:  Markedly anxious and demented. Elderly woman, unable to provide any history.    Eye:  Pupils are equal, round, and reactive.  Extraocular movements intact.    ENT:  No rhinorrhea.  Moist mucus membranes.  Normal tongue and tonsil.    Cardiac:  Regular rate and rhythm.  No murmurs, gallops, or rubs.    Pulmonary:  Clear to auscultation bilaterally.  No wheezes, rales, or  rhonchi.    Abdomen:  Positive bowel sounds.  Abdomen is soft and non-distended, without focal tenderness.    Musculoskeletal:  Normal movement of all extremities without evidence for deficit. No midline tenderness to neck or back. Obvious dislocation to left small finger at PIP joint.     Skin:  Warm and dry without rashes. 5 mm abrasion to the left temple without active bleeding.    Neurologic:  Patient unable to formally participate however, no obvious cranial nerve deformity. Appropriate strength in all extremities.    Psychiatric:  Markedly demented, at baseline per .    Emergency Department Course   Imaging:  Fingers XR, 2-3 views, left   Final Result   IMPRESSION: There is dorsal and ulnar dislocation of the small finger at the level of the PIP joint. No fractures are identified but recommend repeat films following reduction. Degenerative change at multiple IP joints of the left hand. Degenerative    change at the first CMC joint.           Report per radiology    Procedures     Dislocation Reduction   Procedure: Dislocation Reduction  Consent: Verbal from Patient  Risks Discussed: Pain, need for repeat attempts, fracture, neurovascular injury, unsuccessful attempts, and need to go to OR  Universal Protocol: Universal protocol was followed and time out conducted just prior to starting procedure, confirming patient identity, site/side, procedure, patient position, and availability of correct equipment and implants.    Indication: Dislocated L fifth (pinky) finger PIP joint   Location: Left L fifth (pinky) finger  Anesthesia/Sedation: None  Procedure Detail: I manipulated the joint including Traction-counter traction    Immobilized with OtherPatient refused   Post procedure assessment:  Gross deformity resolved , Neurovascular intact , and ROM improved   Patient Status: The patient tolerated the procedure well: Yes. There were no complications.     Emergency Department Course & Assessments:    "  Interventions:  Medications - No data to display     Independent Interpretation (X-rays, CTs, rhythm strip):  I independently reviewed the left fingers XR and see a dislocation of the PIP joint of the left small finger..     Assessments/Consultations/Discussion of Management or Tests:  ED Course as of 11/05/23 1646   Sat Nov 04, 2023   0741 I obtained history and examined the patient as noted above.      Social Determinants of Health affecting care:  None      Disposition:  The patient was discharged to home.     Impression & Plan    Medical Decision Making:  This demented elderly woman presents to us after suffering a fall this morning.  Her  is helping her off the toilet.  She was using her grab bar to stand up, but then lost her balance and fell down.  She landed on her buttock.  She did strike the left side of her head on the side of the toilet, suffering a mild abrasion here.  Otherwise, she dislocated her left small finger.    This evaluation was somewhat challenging.  The patient has severe dementia and is markedly anxious.  Her  was at the bedside and we discussed standard of care including imaging of the head and neck in an elderly woman with head injury.  He was in agreement, though when the patient got to CT, she adamantly refused to lie down on the CT bed in the study was terminated.  I spoke with the  about this, and he feels that she \"barely grazed her head\" and does not want her to be sedated to undergo any type of imaging.  Regarding her hand, she did suffer a dislocation of the finger.  I initially attempted to anesthetize this, but she was very resistant to this.  In the end, her  recommend that I simply grabbed the finger and straighten it despite the fact that it would be painful.  I was able to perform this with limited difficulty and the patient tolerated it well with normal range of motion afterward.  I did recommend we repeat her x-ray, though the  simply " wanted to take the patient home.  We discussed using a splint or other immobilization device with a hand, but the patient refused to wear it and this was also terminated.    In the end, I did explain to the  that his wife may have suffered more serious injury that we have not fully evaluated here.  However, I feel he understands this and I support his desire to avoid further testing.  However, I explained that he should never hesitate to return to us if she develops vomiting, somnolence, new or worsening pain, or any other emergent concerns.    Diagnosis:    ICD-10-CM    1. Closed head injury, initial encounter  S09.90XA       2. Facial abrasion, initial encounter  S00.81XA       3. Finger dislocation, initial encounter  S63.259A       4. History of dementia  Z86.59          Discharge Medications:  Discharge Medication List as of 11/4/2023  8:15 AM         Scribe Disclosure:  SANJU GRANT, am serving as a scribe at 6:21 AM on 11/4/2023 to document services personally performed by Trierweiler, Chad A, MD based on my observations and the provider's statements to me.    11/4/2023   Trierweiler, Chad A, MD Trierweiler, Chad A, MD  11/05/23 7881

## 2025-02-06 ENCOUNTER — APPOINTMENT (OUTPATIENT)
Dept: CT IMAGING | Facility: CLINIC | Age: 80
End: 2025-02-06
Attending: BEHAVIOR TECHNICIAN
Payer: COMMERCIAL

## 2025-02-06 ENCOUNTER — HOSPITAL ENCOUNTER (EMERGENCY)
Facility: CLINIC | Age: 80
Discharge: HOME | End: 2025-02-06
Attending: BEHAVIOR TECHNICIAN
Payer: COMMERCIAL

## 2025-02-06 VITALS
OXYGEN SATURATION: 96 % | RESPIRATION RATE: 18 BRPM | DIASTOLIC BLOOD PRESSURE: 100 MMHG | HEART RATE: 84 BPM | SYSTOLIC BLOOD PRESSURE: 153 MMHG | TEMPERATURE: 98.2 F
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
## 2025-02-06 DIAGNOSIS — S01.01XA SCALP LACERATION, INITIAL ENCOUNTER: ICD-10-CM

## 2025-02-06 DIAGNOSIS — S09.90XA CLOSED HEAD INJURY, INITIAL ENCOUNTER: ICD-10-CM

## 2025-02-06 PROCEDURE — 72125 CT NECK SPINE W/O DYE: CPT

## 2025-02-06 PROCEDURE — 250N000011 HC RX IP 250 OP 636: Performed by: BEHAVIOR TECHNICIAN

## 2025-02-06 PROCEDURE — 12002 RPR S/N/AX/GEN/TRNK2.6-7.5CM: CPT

## 2025-02-06 PROCEDURE — 99284 EMERGENCY DEPT VISIT MOD MDM: CPT | Mod: 25

## 2025-02-06 PROCEDURE — 90715 TDAP VACCINE 7 YRS/> IM: CPT | Performed by: BEHAVIOR TECHNICIAN

## 2025-02-06 PROCEDURE — 90471 IMMUNIZATION ADMIN: CPT | Performed by: BEHAVIOR TECHNICIAN

## 2025-02-06 PROCEDURE — 70450 CT HEAD/BRAIN W/O DYE: CPT

## 2025-02-06 RX ADMIN — CLOSTRIDIUM TETANI TOXOID ANTIGEN (FORMALDEHYDE INACTIVATED), CORYNEBACTERIUM DIPHTHERIAE TOXOID ANTIGEN (FORMALDEHYDE INACTIVATED), BORDETELLA PERTUSSIS TOXOID ANTIGEN (GLUTARALDEHYDE INACTIVATED), BORDETELLA PERTUSSIS FILAMENTOUS HEMAGGLUTININ ANTIGEN (FORMALDEHYDE INACTIVATED), BORDETELLA PERTUSSIS PERTACTIN ANTIGEN, AND BORDETELLA PERTUSSIS FIMBRIAE 2/3 ANTIGEN 0.5 ML: 5; 2; 2.5; 5; 3; 5 INJECTION, SUSPENSION INTRAMUSCULAR at 22:21

## 2025-02-06 ASSESSMENT — ACTIVITIES OF DAILY LIVING (ADL)
ADLS_ACUITY_SCORE: 55

## 2025-02-07 NOTE — DISCHARGE INSTRUCTIONS
Please follow-up with your primary care provider in 10 days for suture removal and reassessment.  You can take Tylenol ibuprofen as needed for the pain.  Please keep wound dry and clean.  Please return to the ED with headache, dizziness, lightheadedness, blurry vision, double vision, nausea, vomiting, lack of coordination, or any other new or worsening symptoms.    Discharge Instructions  Head Injury    You have been seen today for a head injury. Your evaluation included a history and physical examination. You may have had a CT (CAT) scan performed, though most head injuries do not require a scan. Based on this evaluation, your provider today does not feel that your head injury is serious.    Generally, every Emergency Department visit should have a follow-up clinic visit with either a primary or a specialty clinic/provider. Please follow-up as instructed by your emergency provider today.  Return to the Emergency Department if:  You are confused or you are not acting right.  Your headache gets worse or you start to have a really bad headache even with your recommended treatment plan.  You vomit (throw up) more than once.  You have a seizure.  You have trouble walking.  You have weakness or paralysis (cannot move) in an arm or a leg.  You have blood or fluid coming from your ears or nose.  You have new symptoms or anything that worries you.    Sleeping:  It is okay for you to sleep, but someone should wake you up if instructed by your provider, and someone should check on you at your usual time to wake up.     Activity:  Do not drive for at least 24 hours.  Do not drive if you have dizzy spells or trouble concentrating, or remembering things.  Do not return to any contact sports until cleared by your regular provider.     MORE INFORMATION:    Concussion:  A concussion is a minor head injury that may cause temporary problems with the way the brain works. Although concussions are important, they are generally not an  emergency or a reason that a person needs to be hospitalized. Some concussion symptoms include confusion, amnesia (forgetful), nausea (sick to your stomach) and vomiting (throwing up), dizziness, fatigue, memory or concentration problems, irritability and sleep problems. For most people, concussions are mild and temporary but some will have more severe and persistent symptoms that require on-going care and treatment.  CT Scans: Your evaluation today may have included a CT scan (CAT scan) to look for things like bleeding or a skull fracture (broken bone).  CT scans involve radiation and too many CT scans can cause serious health problems like cancer, especially in children.  Because of this, your provider may not have ordered a CT scan today if they think you are at low risk for a serious or life threatening problem.    Blood Thinners. If you take blood thinners, there is a very small risk of delayed bleeding after your head injury. In the days/weeks to come, watch for the symptoms described above particularly headaches, confusion, problems walking, and weakness in an arm or leg.    If you were given a prescription for medicine here today, be sure to read all of the information (including the package insert) that comes with your prescription.  This will include important information about the medicine, its side effects, and any warnings that you need to know about.  The pharmacist who fills the prescription can provide more information and answer questions you may have about the medicine.  If you have questions or concerns that the pharmacist cannot address, please call or return to the Emergency Department.     Remember that you can always come back to the Emergency Department if you are not able to see your regular provider in the amount of time listed above, if you get any new symptoms, or if there is anything that worries you.

## 2025-02-07 NOTE — ED PROVIDER NOTES
"  Emergency Department Note      History of Present Illness     Chief Complaint   Head Laceration      HPI   Tracy Yee is a 79 year old female ***    Independent Historian   {ERNESTOA Independent Historian:461390::\"None\"}    Review of External Notes   ***    Past Medical History     Medical History and Problem List   Past Medical History:   Diagnosis Date    Breast cancer (H)     Hypertension     Osteopenia     Parkinson's disease (H)     Prediabetes     Thyroid disease        Medications   acetaminophen (TYLENOL) 500 MG tablet  amLODIPine (NORVASC) 5 MG tablet  carbidopa-levodopa (SINEMET)  MG tablet  carbidopa-levodopa (SINEMET)  MG tablet  levothyroxine (SYNTHROID/LEVOTHROID) 75 MCG tablet  rivastigmine (EXELON) 3 MG capsule        Surgical History   Past Surgical History:   Procedure Laterality Date    BREAST SURGERY      lumpectomy    COLONOSCOPY      COLONOSCOPY N/A 7/25/2018    Procedure: COMBINED COLONOSCOPY, SINGLE OR MULTIPLE BIOPSY/POLYPECTOMY BY BIOPSY;  COLONOSCOPY ;  Surgeon: Brittnee Fuentes MD;  Location:  GI    GYN SURGERY      hysterectomy    PHACOEMULSIFICATION CLEAR CORNEA WITH STANDARD INTRAOCULAR LENS IMPLANT Right 4/26/2017    Procedure: PHACOEMULSIFICATION CLEAR CORNEA WITH STANDARD INTRAOCULAR LENS IMPLANT;  RIGHT EYE PHACOEMULSIFICATION CLEAR CORNEA WITH STANDARD INTRAOCULAR LENS IMPLANT ;  Surgeon: Judd Moscoso MD;  Location: [facility]    PHACOEMULSIFICATION CLEAR CORNEA WITH STANDARD INTRAOCULAR LENS IMPLANT Left 5/3/2017    Procedure: PHACOEMULSIFICATION CLEAR CORNEA WITH STANDARD INTRAOCULAR LENS IMPLANT;  LEFT EYE PHACOEMULSIFICATION CLEAR CORNEA WITH STANDARD INTRAOCULAR LENS IMPLANT ;  Surgeon: Judd Moscoso MD;  Location: [facility]       Physical Exam     Patient Vitals for the past 24 hrs:   BP Temp Temp src Pulse Resp SpO2   02/06/25 2025 (!) 153/100 98.2  F (36.8  C) Temporal 84 18 96 %     Physical Exam  ***    Diagnostics     Lab Results   Labs Ordered " "and Resulted from Time of ED Arrival to Time of ED Departure - No data to display    Imaging   CT Head w/o Contrast    (Results Pending)   CT Cervical Spine w/o Contrast    (Results Pending)       EKG   ECG taken at ***, ECG read at ***  ***   *** as compared to prior, dated ***/***/***.  Rate *** bpm. [place] interval *** ms. QRS duration *** ms. QT/QTc ***/*** ms. P-R-T axes *** *** ***.    Independent Interpretation   {IndependentReview:656086::\"None\"}    ED Course      Medications Administered   Medications   Tdap (tetanus-diphtheria-acell pertussis) (ADACEL) injection 0.5 mL (has no administration in time range)       Procedures   Procedures     Discussion of Management   {Consults/Care Discussions:976996::\"None\"}    ED Course   ED Course as of 02/06/25 2054 Thu Feb 06, 2025 2052 I evaluated patient and obtained history.        Additional Documentation  {EPPAAdditionalPhrase:594141::\"None\"}    Medical Decision Making / Diagnosis     CMS Diagnoses: {Sepsis/Septic Shock/Stemi/Stroke:376984::\"None\"}    MIPS       {EPPA MIPS:499188::\"None\"}    TATUM Yee is a 79 year old female ***    Disposition   {EPPAFV Dispo:255284}    Diagnosis   No diagnosis found.     Discharge Medications   New Prescriptions    No medications on file         {Provider or scribe signature:733923}    " above.      CT Head w/o Contrast   Final Result   IMPRESSION:   Motion degraded exam. Within these confines:      HEAD CT:   1.  No definite CT evidence for acute intracranial process.   2.  Brain atrophy and presumed chronic microvascular ischemic changes as above.      CERVICAL SPINE CT:   1.  No CT evidence for acute fracture or post traumatic subluxation.   2.  Multilevel degenerative spondylolisthesis, as above.          EKG   None    Independent Interpretation   CT Head: No intracranial hemorrhage.    ED Course      Medications Administered   Medications   Tdap (tetanus-diphtheria-acell pertussis) (ADACEL) injection 0.5 mL (0.5 mLs Intramuscular $Given 2/6/25 2221)       Procedures   Procedures     Laceration Repair      Procedure: Laceration Repair    Indication: Laceration    Consent: Verbal    Tetanus status reviewed: 04/22/2014    Location: Right Scalp     Length: 4 cm    Preparation: Irrigation with Sterile Saline.    Anesthesia/Sedation: Bupivacaine - 0.5%      Treatment/Exploration: Wound explored, no foreign bodies found     Closure: The wound was closed with  3 staples.    Patient Status: The patient tolerated the procedure well: Yes. There were no complications.      Discussion of Management   None    ED Course   ED Course as of 02/20/25 2329 Thu Feb 06, 2025 2052 I evaluated patient and obtained history.    2256 Rechecked patient.        Additional Documentation  None    Medical Decision Making / Diagnosis     CMS Diagnoses: None    MIPS       None    MDM   Tracy Yee is a 79 year old female with history of dementia who presents to the ED for evaluation of a head injury after a witnessed mechanical fall.  Patient is an unreliable historian due to history of dementia.   reports that she slid out of her dining room chair after bending down to pick something up off the floor this evening.  She hit her head on the floor and sustained a laceration to the right scalp.  No blood thinner  use.  See further HPI details above.  Broad differential was considered including intracranial hemorrhage, traumatic subarachnoid hemorrhage, skull fracture, contusion, concussion.  On exam, patient is well-appearing.  Her vitals are notable for elevated blood pressure but otherwise reassuring.  Patient's elevated blood pressure is consistent with her chronic hypertension.  Exam is notable for a laceration to the right parietal scalp.  No active bleeding.  She is neurologically intact.  Thankfully, CT of the head is negative for acute hemorrhage or fracture.  CT of the cervical spine is negative for acute fracture or subluxation.  Head to toe trauma examination is otherwise negative.  Head laceration was cleaned and repaired as noted above.  Tetanus was updated today.  I believe patient's fall was truly mechanical.  I do not think any labs are indicated.  I think she is safe to discharge home with .  Wound care instructions were given.  Discussed close follow-up with PCP for suture removal and reassessment.  Discussed return to the ER with headache, dizziness, lightheadedness, confusion, loss of consciousness, or any other new or worsening symptoms.  Patient and  voice understanding and are agreeable to plan of care.      Disposition   The patient was discharged.     Diagnosis     ICD-10-CM    1. Fall, initial encounter  W19.XXXA       2. Closed head injury, initial encounter  S09.90XA       3. Scalp laceration, initial encounter  S01.01XA            Discharge Medications   Discharge Medication List as of 2/6/2025 10:59 PM            Reynaldo TRAMMELL. CITLALI Mas Kausar H, PA-C  02/20/25 0671       Reynaldo Mas PA-C  02/21/25 0228

## 2025-02-07 NOTE — ED TRIAGE NOTES
Patient presents to the ED via triage with  for head laceration. Patient was at dinner when she reached down to pick something up off the floor and slid out of her chair. Laceration to head, bleeding controlled.      Triage Assessment (Adult)       Row Name 02/06/25 2017          Triage Assessment    Airway WDL WDL        Respiratory WDL    Respiratory WDL WDL        Skin Circulation/Temperature WDL    Skin Circulation/Temperature WDL X  laceration        Peripheral/Neurovascular WDL    Peripheral Neurovascular WDL WDL        Cognitive/Neuro/Behavioral WDL    Cognitive/Neuro/Behavioral WDL X     Level of Consciousness confused

## 2025-03-31 ENCOUNTER — APPOINTMENT (OUTPATIENT)
Dept: GENERAL RADIOLOGY | Facility: CLINIC | Age: 80
End: 2025-03-31
Attending: EMERGENCY MEDICINE
Payer: COMMERCIAL

## 2025-03-31 ENCOUNTER — APPOINTMENT (OUTPATIENT)
Dept: CT IMAGING | Facility: CLINIC | Age: 80
End: 2025-03-31
Payer: COMMERCIAL

## 2025-03-31 ENCOUNTER — HOSPITAL ENCOUNTER (EMERGENCY)
Facility: CLINIC | Age: 80
Discharge: HOME | End: 2025-03-31
Payer: COMMERCIAL

## 2025-03-31 VITALS
HEART RATE: 83 BPM | OXYGEN SATURATION: 98 % | SYSTOLIC BLOOD PRESSURE: 99 MMHG | DIASTOLIC BLOOD PRESSURE: 50 MMHG | TEMPERATURE: 97 F | RESPIRATION RATE: 16 BRPM
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
## 2025-03-31 DIAGNOSIS — S42.031A CLOSED DISPLACED FRACTURE OF ACROMIAL END OF RIGHT CLAVICLE, INITIAL ENCOUNTER: ICD-10-CM
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
## 2025-03-31 LAB
ANION GAP SERPL CALCULATED.3IONS-SCNC: 11 MMOL/L (ref 7–15)
BASOPHILS # BLD AUTO: 0 10E3/UL (ref 0–0.2)
BASOPHILS NFR BLD AUTO: 1 %
BUN SERPL-MCNC: 40.4 MG/DL (ref 8–23)
CALCIUM SERPL-MCNC: 8.9 MG/DL (ref 8.8–10.4)
CHLORIDE SERPL-SCNC: 101 MMOL/L (ref 98–107)
CREAT SERPL-MCNC: 2.19 MG/DL (ref 0.51–0.95)
EGFRCR SERPLBLD CKD-EPI 2021: 22 ML/MIN/1.73M2
EOSINOPHIL # BLD AUTO: 0.1 10E3/UL (ref 0–0.7)
EOSINOPHIL NFR BLD AUTO: 1 %
ERYTHROCYTE [DISTWIDTH] IN BLOOD BY AUTOMATED COUNT: 13.7 % (ref 10–15)
GLUCOSE SERPL-MCNC: 112 MG/DL (ref 70–99)
HCO3 SERPL-SCNC: 25 MMOL/L (ref 22–29)
HCT VFR BLD AUTO: 31.9 % (ref 35–47)
HGB BLD-MCNC: 10.3 G/DL (ref 11.7–15.7)
IMM GRANULOCYTES # BLD: 0 10E3/UL
IMM GRANULOCYTES NFR BLD: 1 %
LYMPHOCYTES # BLD AUTO: 0.7 10E3/UL (ref 0.8–5.3)
LYMPHOCYTES NFR BLD AUTO: 11 %
MCH RBC QN AUTO: 30.9 PG (ref 26.5–33)
MCHC RBC AUTO-ENTMCNC: 32.3 G/DL (ref 31.5–36.5)
MCV RBC AUTO: 96 FL (ref 78–100)
MONOCYTES # BLD AUTO: 0.4 10E3/UL (ref 0–1.3)
MONOCYTES NFR BLD AUTO: 7 %
NEUTROPHILS # BLD AUTO: 5.1 10E3/UL (ref 1.6–8.3)
NEUTROPHILS NFR BLD AUTO: 80 %
NRBC # BLD AUTO: 0 10E3/UL
NRBC BLD AUTO-RTO: 0 /100
PLATELET # BLD AUTO: 167 10E3/UL (ref 150–450)
POTASSIUM SERPL-SCNC: 3.9 MMOL/L (ref 3.4–5.3)
RBC # BLD AUTO: 3.33 10E6/UL (ref 3.8–5.2)
SODIUM SERPL-SCNC: 137 MMOL/L (ref 135–145)
WBC # BLD AUTO: 6.4 10E3/UL (ref 4–11)

## 2025-03-31 PROCEDURE — 72125 CT NECK SPINE W/O DYE: CPT

## 2025-03-31 PROCEDURE — 36415 COLL VENOUS BLD VENIPUNCTURE: CPT

## 2025-03-31 PROCEDURE — 99285 EMERGENCY DEPT VISIT HI MDM: CPT | Mod: 25

## 2025-03-31 PROCEDURE — 73030 X-RAY EXAM OF SHOULDER: CPT | Mod: RT

## 2025-03-31 PROCEDURE — 85018 HEMOGLOBIN: CPT

## 2025-03-31 PROCEDURE — 70450 CT HEAD/BRAIN W/O DYE: CPT

## 2025-03-31 PROCEDURE — 85004 AUTOMATED DIFF WBC COUNT: CPT

## 2025-03-31 PROCEDURE — 80048 BASIC METABOLIC PNL TOTAL CA: CPT

## 2025-03-31 ASSESSMENT — ACTIVITIES OF DAILY LIVING (ADL)
ADLS_ACUITY_SCORE: 55

## 2025-04-01 LAB
ATRIAL RATE - MUSE: 76 BPM
DIASTOLIC BLOOD PRESSURE - MUSE: NORMAL MMHG
INTERPRETATION ECG - MUSE: NORMAL
P AXIS - MUSE: 58 DEGREES
PR INTERVAL - MUSE: 174 MS
QRS DURATION - MUSE: 78 MS
QT - MUSE: 372 MS
QTC - MUSE: 418 MS
R AXIS - MUSE: -38 DEGREES
SYSTOLIC BLOOD PRESSURE - MUSE: NORMAL MMHG
T AXIS - MUSE: 12 DEGREES
VENTRICULAR RATE- MUSE: 76 BPM

## 2025-04-01 NOTE — ED PROVIDER NOTES
Emergency Department Note      History of Present Illness     Chief Complaint   Fall and Shoulder Injury      HPI   Tracy Yee is a 79 year old female with history of hypertension, Parkinson's disease, breast cancer, and Lewy body dementia who presents for evaluation of right shoulder pain after fall.  Patient's  states 2 days ago, patient fell off of her dining room chair onto her right shoulder, fall was unwitnessed.  States she did not lose consciousness, she is not on blood thinners.  Patient's  noted today a deformity in her right shoulder when he was helping her change her shirt.  Patient had not told  that her shoulder hurts although  states she does have a history of Lewy body dementia and is frequently confused.  States no change in her baseline mental status today.  Patient denies headache, neck pain, back pain.    Independent Historian   Father as detailed above.    Review of External Notes   2/6/2025 ED note reviewed for fall, closed head injury, reviewed head and neck CT.  3/22/2025, reviewed IM telephone note for acute cystitis, prescribed Keflex.  Past Medical History     Medical History and Problem List   Past Medical History:   Diagnosis Date    Breast cancer (H)     Hypertension     Osteopenia     Parkinson's disease (H)     Prediabetes     Thyroid disease        Medications   acetaminophen (TYLENOL) 500 MG tablet  amLODIPine (NORVASC) 5 MG tablet  carbidopa-levodopa (SINEMET)  MG tablet  carbidopa-levodopa (SINEMET)  MG tablet  levothyroxine (SYNTHROID/LEVOTHROID) 75 MCG tablet  rivastigmine (EXELON) 3 MG capsule        Surgical History   Past Surgical History:   Procedure Laterality Date    BREAST SURGERY      lumpectomy    COLONOSCOPY      COLONOSCOPY N/A 7/25/2018    Procedure: COMBINED COLONOSCOPY, SINGLE OR MULTIPLE BIOPSY/POLYPECTOMY BY BIOPSY;  COLONOSCOPY ;  Surgeon: Brittnee Fuentes MD;  Location:  GI    GYN SURGERY      hysterectomy     PHACOEMULSIFICATION CLEAR CORNEA WITH STANDARD INTRAOCULAR LENS IMPLANT Right 4/26/2017    Procedure: PHACOEMULSIFICATION CLEAR CORNEA WITH STANDARD INTRAOCULAR LENS IMPLANT;  RIGHT EYE PHACOEMULSIFICATION CLEAR CORNEA WITH STANDARD INTRAOCULAR LENS IMPLANT ;  Surgeon: Judd Moscoso MD;  Location: [facility]    PHACOEMULSIFICATION CLEAR CORNEA WITH STANDARD INTRAOCULAR LENS IMPLANT Left 5/3/2017    Procedure: PHACOEMULSIFICATION CLEAR CORNEA WITH STANDARD INTRAOCULAR LENS IMPLANT;  LEFT EYE PHACOEMULSIFICATION CLEAR CORNEA WITH STANDARD INTRAOCULAR LENS IMPLANT ;  Surgeon: Judd Moscoso MD;  Location: [facility]       Physical Exam     Patient Vitals for the past 24 hrs:   BP Temp Temp src Pulse Resp SpO2   03/31/25 1654 99/50 97  F (36.1  C) Temporal 83 16 98 %     Physical Exam  Constitutional: Alert, attentive, GCS 15   HENT: No contusions, skin was intact, no deformities, no Viramontes sign, no periorbital bruising.   Nose: Nose normal.    Mouth/Throat: Oropharynx is clear, mucous membranes are moist   Eyes: EOM are normal.   Neck/Back: No posterior midline tenderness or step-offs.  CV: regular rate and rhythm. 2+ radial and ulnar pulses to the bilateral upper extremities   Chest: Effort normal and breath sounds normal.   GI:   There is no tenderness. No distension. Normal bowel sounds   Neurological: 5/5 strength to the radian, ulnar and median motor distributions;   sensation intact to light touch to the radian, ulnar and median distributions   MSK: Deformity noted right shoulder with significant surrounding bruising, pain with passive range of motion of the right shoulder, neurovascularly intact distal.  Skin: Skin is warm and dry.        Diagnostics     Lab Results   Labs Ordered and Resulted from Time of ED Arrival to Time of ED Departure   BASIC METABOLIC PANEL - Abnormal       Result Value    Sodium 137      Potassium 3.9      Chloride 101      Carbon Dioxide (CO2) 25      Anion Gap 11      Urea Nitrogen  40.4 (*)     Creatinine 2.19 (*)     GFR Estimate 22 (*)     Calcium 8.9      Glucose 112 (*)    CBC WITH PLATELETS AND DIFFERENTIAL - Abnormal    WBC Count 6.4      RBC Count 3.33 (*)     Hemoglobin 10.3 (*)     Hematocrit 31.9 (*)     MCV 96      MCH 30.9      MCHC 32.3      RDW 13.7      Platelet Count 167      % Neutrophils 80      % Lymphocytes 11      % Monocytes 7      % Eosinophils 1      % Basophils 1      % Immature Granulocytes 1      NRBCs per 100 WBC 0      Absolute Neutrophils 5.1      Absolute Lymphocytes 0.7 (*)     Absolute Monocytes 0.4      Absolute Eosinophils 0.1      Absolute Basophils 0.0      Absolute Immature Granulocytes 0.0      Absolute NRBCs 0.0         Imaging   CT Cervical Spine w/o Contrast   Final Result   IMPRESSION:   1.  No definite acute fracture or posttraumatic subluxation.   2.  Apparent new prevertebral soft tissue thickening at the levels of C2 down to T1, nonspecific, but potentially reflecting ligamentous injury in the setting of trauma. Recommend MRI cervical spine for further assessment.   3.  Degenerative changes, as above.      CT Head w/o Contrast   Final Result   IMPRESSION:   1.  Within exam limitations, there is no evidence of acute intracranial hemorrhage.   2.  No evidence of acute calvarial fracture.   3.  Age-related change.         XR Shoulder Right G/E 3 Views   Final Result   IMPRESSION: Comminuted, displaced and impacted fracture distal clavicle with probable extension into the AC joint. There is no diastases of the AC joint space. Curvilinear fracture fragment along the expected location of the coracoclavicular ligament.    Soft tissue swelling. Bone demineralization.       NOTE: ABNORMAL REPORT      THE DICTATION ABOVE DESCRIBES AN ABNORMALITY FOR WHICH FOLLOW-UP IS NEEDED.           EKG   ECG results from 03/31/25   EKG 12-lead, tracing only     Value    Systolic Blood Pressure     Diastolic Blood Pressure     Ventricular Rate 76    Atrial Rate 76    CA  Interval 174    QRS Duration 78        QTc 418    P Axis 58    R AXIS -38    T Axis 12    Interpretation ECG      Sinus rhythm  Left axis deviation  Voltage criteria for left ventricular hypertrophy ( R in aVL , Sokolow-Malone , Otilio product )  Septal infarct , age undetermined  Abnormal ECG  When compared with ECG of 14-May-2023 19:45,  Septal infarct is now Present  Nonspecific T wave abnormality now evident in Inferior leads            Independent Interpretation   CT Head: No intracranial hemorrhage or midline shift.  X-ray right shoulder shows clavicle fracture, distal    ED Course      Medications Administered   Medications - No data to display    Procedures   Procedures     Discussion of Management   With Dr. Valderrama    ED Course   ED Course as of 03/31/25 2210   Mon Mar 31, 2025   1915 I evaluated and examined the patient   1950 Orthopedic injury, I staffed this patient with Dr. Valderrama.       Additional Documentation  None    Medical Decision Making / Diagnosis     CMS Diagnoses: None    MIPS       None    [facility]   Tracy Yee is a 79 year old female with history of hypertension, Parkinson's disease, breast cancer, and Lewy body dementia who presents for evaluation of right shoulder pain after fall.  Differential includes but is not limited to ICH, cervical spine injury, shoulder dislocation/fracture, and various other musculoskeletal injuries.  Vitals reassuring without fever, tachycardia, hypoxia.  On physical exam, patient was not toxic appearing, she appeared confused however  states she is at her baseline.  Her right shoulder appeared deformed and had significant surrounding ecchymosis, she was neurovascularly intact distal.    There is no leukocytosis, there was a mild anemia however she is at baseline.  There were no significant electrolyte derangements.  Patient's renal function was near her baseline.  X-ray of the right shoulder was significant for a comminuted, displaced and  impacted fracture of the distal clavicle.  Given orthopedic injury, I staffed this patient with Dr. Valderrama.  CT of head was free of acute intracranial findings although exam was limited due to patient movement secondary to Parkinson's.  CT of cervical spine had no definite acute fracture or subluxation.  There appeared to be new prevertebral soft tissue thickening throughout the cervical spine, may possibly represent a ligamentous injury, recommendation for MRI cervical spine follow-up.  I spoke to patient and  regarding CT findings,  who is patient's caregiver declined MRI tonight.  I did make him aware the CT exam was limited that we would not be able to evaluate soft tissue without MRI however he declined understanding the risks.  Dr. Valderrama was also aware of this conversation.  Patient continued to deny neck and back pain and upper extremity weakness.  Patient's  states he will follow-up with the patient's doctor tomorrow.  Given his clavicle fracture, will place patient in a sling and have her follow-up outpatient with [facility].  I discussed return precautions with patient's  including worsening pain, upper or lower extremity weakness, and numbness and tingling in the right upper extremity among many others.   states he understands and agrees.  Patient was discharged.                                                                     Disposition   The patient was discharged.     Diagnosis     ICD-10-CM    1. Closed displaced fracture of acromial end of right clavicle, initial encounter  S42.031A Wrist/Arm/Hand Bracing Supplies Order Sling; Right      2. Fall at home, initial encounter  W19.XXXA     Y92.009            Discharge Medications   Discharge Medication List as of 3/31/2025  9:07 PM            CITLALI Guillen John, PA-C  03/31/25 9429

## 2025-04-01 NOTE — DISCHARGE INSTRUCTIONS
Thank you for coming to [facility] emergency department.  You have a fracture at the distal end of your clavicle.  Please wear the sling until you follow-up with [facility].  You may take Tylenol for pain.  We also discussed the findings of the neck CT, you declined an MRI today, please follow-up with PCP to discuss further evaluation.  Please return to the emergency department for worsening pain, numbness in your right arm or hand, or any other concerns.

## 2025-04-01 NOTE — ED PROVIDER NOTES
Emergency Department Attending Supervision Note  3/31/2025  9:14 PM      I evaluated this patient in conjunction with Parker Kumar PA-C      Briefly, the patient presented with a fall 2 days ago likely secondary to her Parkinson's and Lewy body dementia.  Plan on her right shoulder and this was unwitnessed.  There was no LOC reported and she is not on thinners.  She is complaint of right shoulder pain.      On my exam, BP 99/50   Pulse 83   Temp 97  F (36.1  C) (Temporal)   Resp 16   SpO2 98%   Constitutional: Vital signs reviewed.   HEENT: Moist mucous membranes  Cardiovascular: Regular rate and rhythm  Pulmonary/Chest: Breathing comfortably on room air.  No audible wheezing  Musculoskeletal/Extremities: Tenderness with palpation over the distal right clavicle.  There is a mild bony deformity.  Neurological: Alert.  No focal deficits.  Endo: No pitting edema  Skin: No visible rash.  Psychiatric: Unable to assess secondary to dementia      Results:  X-ray of the right clavicle shows comminuted displaced impacted fracture of the distal clavicle but probably stench in the AC joint.  CT scan head was negative for blood or midline shift  CT of the cervical spine without contrast shows no evidence of acute fracture or subluxation.  There was new perivertebral soft tissue thickening at C2-T1 which is nonspecific but there is concern for ligamentous injury.  MRI was recommended.      My impression is fall 2 days ago resulting in the clavicular fracture as noted above.  She will be given a sling for comfort.  We attempted EKG x 2 but there is too much movement secondary to her Parkinson's and dementia.  She is not complaining of chest discomfort.  Her creatinine is above her baseline of 2.19 but her BUN is also elevated suggesting some mild dehydration.  Hemoglobin stable at 10.3.  Again the CT of the head was negative the CT of the C-spine's did show some new perivertebral soft tissue thickening with a concern for  possible ligamentous injury.  We discussed this with the patient and her  and they adamantly wanted go home.  I think this is reasonable.  They do understand that they would likely need to get imaging if symptoms progress or worsen.  Certainly return to the emerged part immediately if something changes.        Diagnosis    ICD-10-CM    1. Closed displaced fracture of acromial end of right clavicle, initial encounter  S42.031A Wrist/Arm/Hand Bracing Supplies Order Sling; Right            No att. providers found        Rosalio Valderrama MD  03/31/25 [id number]

## (undated) DEVICE — EYE PACK BVI READYPAK KIT #1

## (undated) DEVICE — PACK CATARACT CUSTOM SO DALE SEY32CTFCX

## (undated) DEVICE — GLOVE PROTEXIS MICRO 6.0  2D73PM60

## (undated) DEVICE — LINEN TOWEL PACK X5 5464

## (undated) DEVICE — EYE KNIFE SLIT XSTAR VISITEC 2.6MM 45DEG 373726

## (undated) DEVICE — DRAPE SHEET REV FOLD 3/4 9349

## (undated) DEVICE — GLOVE PROTEXIS MICRO 7.0  2D73PM70

## (undated) DEVICE — EYE SOL BSS 500ML

## (undated) DEVICE — EYE PACK CUSTOM ANTERIOR 45DEG TIP CENTURION PPK6925-01

## (undated) DEVICE — GLOVE PROTEXIS MICRO 8.0  2D73PM80

## (undated) DEVICE — EYE SHIELD PLASTIC

## (undated) RX ORDER — FENTANYL CITRATE 50 UG/ML
INJECTION, SOLUTION INTRAMUSCULAR; INTRAVENOUS
Status: DISPENSED
Start: 2018-07-25

## (undated) RX ORDER — ONDANSETRON 2 MG/ML
INJECTION INTRAMUSCULAR; INTRAVENOUS
Status: DISPENSED
Start: 2017-04-26

## (undated) RX ORDER — DIPHENHYDRAMINE HYDROCHLORIDE 50 MG/ML
INJECTION INTRAMUSCULAR; INTRAVENOUS
Status: DISPENSED
Start: 2018-07-25

## (undated) RX ORDER — ONDANSETRON 2 MG/ML
INJECTION INTRAMUSCULAR; INTRAVENOUS
Status: DISPENSED
Start: 2018-07-25

## (undated) RX ORDER — LIDOCAINE HYDROCHLORIDE 10 MG/ML
INJECTION, SOLUTION EPIDURAL; INFILTRATION; INTRACAUDAL; PERINEURAL
Status: DISPENSED
Start: 2017-05-03